# Patient Record
Sex: FEMALE | Race: WHITE | Employment: UNEMPLOYED | URBAN - METROPOLITAN AREA
[De-identification: names, ages, dates, MRNs, and addresses within clinical notes are randomized per-mention and may not be internally consistent; named-entity substitution may affect disease eponyms.]

---

## 2019-06-18 ENCOUNTER — APPOINTMENT (OUTPATIENT)
Dept: CT IMAGING | Age: 39
End: 2019-06-18
Attending: PHYSICIAN ASSISTANT
Payer: MEDICAID

## 2019-06-18 ENCOUNTER — HOSPITAL ENCOUNTER (EMERGENCY)
Age: 39
Discharge: HOME | End: 2019-06-18
Attending: EMERGENCY MEDICINE
Payer: MEDICAID

## 2019-06-18 VITALS
TEMPERATURE: 98.1 F | SYSTOLIC BLOOD PRESSURE: 130 MMHG | RESPIRATION RATE: 18 BRPM | HEART RATE: 98 BPM | OXYGEN SATURATION: 100 % | DIASTOLIC BLOOD PRESSURE: 73 MMHG

## 2019-06-18 DIAGNOSIS — S09.90XA CLOSED HEAD INJURY, INITIAL ENCOUNTER: Primary | ICD-10-CM

## 2019-06-18 PROCEDURE — 74011250636 HC RX REV CODE- 250/636: Performed by: EMERGENCY MEDICINE

## 2019-06-18 PROCEDURE — 99283 EMERGENCY DEPT VISIT LOW MDM: CPT

## 2019-06-18 PROCEDURE — 90471 IMMUNIZATION ADMIN: CPT

## 2019-06-18 PROCEDURE — 90715 TDAP VACCINE 7 YRS/> IM: CPT | Performed by: EMERGENCY MEDICINE

## 2019-06-18 RX ORDER — ONDANSETRON 8 MG/1
8 TABLET, ORALLY DISINTEGRATING ORAL
Qty: 15 TAB | Refills: 0 | Status: SHIPPED | OUTPATIENT
Start: 2019-06-18 | End: 2019-06-18

## 2019-06-18 RX ORDER — ONDANSETRON 8 MG/1
8 TABLET, ORALLY DISINTEGRATING ORAL
Qty: 15 TAB | Refills: 0 | Status: SHIPPED | OUTPATIENT
Start: 2019-06-18 | End: 2022-09-01

## 2019-06-18 RX ADMIN — TETANUS TOXOID, REDUCED DIPHTHERIA TOXOID AND ACELLULAR PERTUSSIS VACCINE, ADSORBED 0.5 ML: 5; 2.5; 8; 8; 2.5 SUSPENSION INTRAMUSCULAR at 19:30

## 2019-06-18 NOTE — ED TRIAGE NOTES
Patient comes to the ER via EMS from [facility] c/o 1.5in laceration after hitting head on metal bar on playground.  6 staples [facility] head per [facility]. Bleeding controlled upon arrival.

## 2019-06-18 NOTE — ED PROVIDER NOTES
42 y.o. female with no significant past medical history who presents from [facility] via EMS with chief complaint of head injury. Patient was playing with her son earlier today at a play ground, when she began chasing him, running under a ramp, and then hitting the top of her head with no LOC. Patient was seen at [facility] where she received 6 staples, and then was referred to the ED for CT scan of her head. Patient presents to [facility] ED with persisting head injury and accompanying nausea and pain at wound on head. Patient states she does not want a CT if it is not necessary. Patient endorses use of  Bupropion for anxiety and depression. Of note, patient does not know when her last Tetanus shot was. Patient denies vomiting and neck pain. There are no other acute medical concerns at this time. Note written by Kamila Somers, as dictated by No att. providers found 6:50 PM       The history is provided by the patient. No  was used. Past Medical History:   Diagnosis Date    Anxiety     Depression        No past surgical history on file. No family history on file.     Social History     Socioeconomic History    Marital status:      Spouse name: Not on file    Number of children: Not on file    Years of education: Not on file    Highest education level: Not on file   Occupational History    Not on file   Social Needs    Financial resource strain: Not on file    Food insecurity:     Worry: Not on file     Inability: Not on file    Transportation needs:     Medical: Not on file     Non-medical: Not on file   Tobacco Use    Smoking status: Not on file   Substance and Sexual Activity    Alcohol use: Not on file    Drug use: Not on file    Sexual activity: Not on file   Lifestyle    Physical activity:     Days per week: Not on file     Minutes per session: Not on file    Stress: Not on file   Relationships    Social connections:     Talks on phone: Not on file     Gets together: Not on file     Attends Shinto service: Not on file     Active member of club or organization: Not on file     Attends meetings of clubs or organizations: Not on file     Relationship status: Not on file    Intimate partner violence:     Fear of current or ex partner: Not on file     Emotionally abused: Not on file     Physically abused: Not on file     Forced sexual activity: Not on file   Other Topics Concern    Not on file   Social History Narrative    Not on file         ALLERGIES: Patient has no known allergies. Review of Systems   Constitutional: Negative for chills, diaphoresis, fatigue and fever. HENT: Negative for congestion, ear pain, rhinorrhea, sore throat and voice change. Respiratory: Negative for shortness of breath. Cardiovascular: Negative for chest pain. Gastrointestinal: Positive for nausea. Negative for abdominal pain, constipation, diarrhea and vomiting. Genitourinary: Negative for difficulty urinating, dyspareunia, dysuria, flank pain and pelvic pain. Musculoskeletal: Negative for back pain, gait problem, joint swelling, myalgias and neck pain. Skin: Positive for wound. Neurological: Negative for dizziness and light-headedness. All other systems reviewed and are negative. Vitals:    06/18/19 1755 06/18/19 1854   BP:  130/73   Pulse: 89 98   Resp:  18   Temp:  98.1 °F (36.7 °C)   SpO2: 99% 100%            Physical Exam   Constitutional: She is oriented to person, place, and time. She appears well-developed and well-nourished. HENT:   Head: Normocephalic. 3-4 cm laceration on top of head, closed with 6 staples. Eyes: Pupils are equal, round, and reactive to light. Neck: Normal range of motion. Neck supple. Cardiovascular: Normal rate and regular rhythm. Pulmonary/Chest: Effort normal and breath sounds normal.   Abdominal: Soft. She exhibits no distension. There is no tenderness. Musculoskeletal: Normal range of motion.  She exhibits no tenderness. No Cervical spine tenderness. Neurological: She is alert and oriented to person, place, and time. Skin: Skin is warm and dry. Capillary refill takes less than 2 seconds. Psychiatric: She has a normal mood and affect. Her behavior is normal.   Nursing note and vitals reviewed. Note written by Kamila Mccullough, as dictated by Paulo Jones MD 6:50 PM       MDM  Number of Diagnoses or Management Options  Closed head injury, initial encounter:   Diagnosis management comments: The patient is now resting comfortably and feels better, is alert and in no distress. The patient has a normal mental status and is neurologically intact. The history, exam, diagnostic testing (if any), and current condition do not demonstrate signs of clinically significant intra-cranial, intra-thoracic, intra-abdominal, or musculoskeletal trauma. Patient satisfies [place] CT and NEXUS criteria. Discussed CT imaging and the patient would like to forgo imaging currently, returning to the ED with worsening or new symptoms. Given return and concussion precautions. The vital signs have been stable. The patient's condition is stable and appropriate for discharge. The patient will pursue further outpatient evaluation with the primary care physician or other designated or consulting physician as indicated in the discharge instructions.            Procedures

## 2020-12-13 ENCOUNTER — APPOINTMENT (OUTPATIENT)
Dept: GENERAL RADIOLOGY | Age: 40
End: 2020-12-13
Attending: EMERGENCY MEDICINE
Payer: MEDICAID

## 2020-12-13 ENCOUNTER — HOSPITAL ENCOUNTER (EMERGENCY)
Age: 40
Discharge: HOME | End: 2020-12-14
Attending: STUDENT IN AN ORGANIZED HEALTH CARE EDUCATION/TRAINING PROGRAM
Payer: MEDICAID

## 2020-12-13 ENCOUNTER — APPOINTMENT (OUTPATIENT)
Dept: ULTRASOUND IMAGING | Age: 40
End: 2020-12-13
Attending: STUDENT IN AN ORGANIZED HEALTH CARE EDUCATION/TRAINING PROGRAM
Payer: MEDICAID

## 2020-12-13 DIAGNOSIS — R74.01 ELEVATED TRANSAMINASE LEVEL: ICD-10-CM

## 2020-12-13 DIAGNOSIS — K76.0 HEPATIC STEATOSIS: Primary | ICD-10-CM

## 2020-12-13 DIAGNOSIS — R63.0 DECREASED APPETITE: ICD-10-CM

## 2020-12-13 DIAGNOSIS — R07.89 CHEST PRESSURE: ICD-10-CM

## 2020-12-13 DIAGNOSIS — R10.12 BILATERAL UPPER ABDOMINAL DISCOMFORT: ICD-10-CM

## 2020-12-13 DIAGNOSIS — R10.11 BILATERAL UPPER ABDOMINAL DISCOMFORT: ICD-10-CM

## 2020-12-13 LAB
ALBUMIN SERPL-MCNC: 4.5 G/DL (ref 3.5–5)
ALBUMIN/GLOB SERPL: 1.3 {RATIO} (ref 1.1–2.2)
ALP SERPL-CCNC: 42 U/L (ref 45–117)
ALT SERPL-CCNC: 111 U/L (ref 12–78)
ANION GAP SERPL CALC-SCNC: 8 MMOL/L (ref 5–15)
APPEARANCE UR: CLEAR
AST SERPL-CCNC: 95 U/L (ref 15–37)
BACTERIA URNS QL MICRO: ABNORMAL /HPF
BASOPHILS # BLD: 0.1 K/UL (ref 0–0.1)
BASOPHILS NFR BLD: 1 % (ref 0–1)
BILIRUB SERPL-MCNC: 0.6 MG/DL (ref 0.2–1)
BILIRUB UR QL: NEGATIVE
BUN SERPL-MCNC: 8 MG/DL (ref 6–20)
BUN/CREAT SERPL: 13 (ref 12–20)
CALCIUM SERPL-MCNC: 9.9 MG/DL (ref 8.5–10.1)
CHLORIDE SERPL-SCNC: 100 MMOL/L (ref 97–108)
CO2 SERPL-SCNC: 25 MMOL/L (ref 21–32)
COLOR UR: ABNORMAL
COMMENT, HOLDF: NORMAL
CREAT SERPL-MCNC: 0.64 MG/DL (ref 0.55–1.02)
DIFFERENTIAL METHOD BLD: ABNORMAL
EOSINOPHIL # BLD: 0 K/UL (ref 0–0.4)
EOSINOPHIL NFR BLD: 0 % (ref 0–7)
EPITH CASTS URNS QL MICRO: ABNORMAL /LPF
ERYTHROCYTE [DISTWIDTH] IN BLOOD BY AUTOMATED COUNT: 14.6 % (ref 11.5–14.5)
GLOBULIN SER CALC-MCNC: 3.5 G/DL (ref 2–4)
GLUCOSE SERPL-MCNC: 96 MG/DL (ref 65–100)
GLUCOSE UR STRIP.AUTO-MCNC: NEGATIVE MG/DL
HCG SERPL QL: NEGATIVE
HCT VFR BLD AUTO: 37.8 % (ref 35–47)
HGB BLD-MCNC: 12.4 G/DL (ref 11.5–16)
HGB UR QL STRIP: NEGATIVE
HYALINE CASTS URNS QL MICRO: ABNORMAL /LPF (ref 0–5)
IMM GRANULOCYTES # BLD AUTO: 0 K/UL (ref 0–0.04)
IMM GRANULOCYTES NFR BLD AUTO: 0 % (ref 0–0.5)
KETONES UR QL STRIP.AUTO: 80 MG/DL
LEUKOCYTE ESTERASE UR QL STRIP.AUTO: ABNORMAL
LIPASE SERPL-CCNC: 164 U/L (ref 73–393)
LYMPHOCYTES # BLD: 1.2 K/UL (ref 0.8–3.5)
LYMPHOCYTES NFR BLD: 27 % (ref 12–49)
MCH RBC QN AUTO: 28.3 PG (ref 26–34)
MCHC RBC AUTO-ENTMCNC: 32.8 G/DL (ref 30–36.5)
MCV RBC AUTO: 86.3 FL (ref 80–99)
MONOCYTES # BLD: 0.5 K/UL (ref 0–1)
MONOCYTES NFR BLD: 12 % (ref 5–13)
NEUTS SEG # BLD: 2.7 K/UL (ref 1.8–8)
NEUTS SEG NFR BLD: 60 % (ref 32–75)
NITRITE UR QL STRIP.AUTO: NEGATIVE
NRBC # BLD: 0 K/UL (ref 0–0.01)
NRBC BLD-RTO: 0 PER 100 WBC
PH UR STRIP: 5 [PH] (ref 5–8)
PLATELET # BLD AUTO: 262 K/UL (ref 150–400)
PMV BLD AUTO: 9.6 FL (ref 8.9–12.9)
POTASSIUM SERPL-SCNC: 3.7 MMOL/L (ref 3.5–5.1)
PROT SERPL-MCNC: 8 G/DL (ref 6.4–8.2)
PROT UR STRIP-MCNC: NEGATIVE MG/DL
RBC # BLD AUTO: 4.38 M/UL (ref 3.8–5.2)
RBC #/AREA URNS HPF: ABNORMAL /HPF (ref 0–5)
SAMPLES BEING HELD,HOLD: NORMAL
SODIUM SERPL-SCNC: 133 MMOL/L (ref 136–145)
SP GR UR REFRACTOMETRY: 1.02 (ref 1–1.03)
TROPONIN I SERPL-MCNC: <0.05 NG/ML
UR CULT HOLD, URHOLD: NORMAL
UROBILINOGEN UR QL STRIP.AUTO: 1 EU/DL (ref 0.2–1)
WBC # BLD AUTO: 4.5 K/UL (ref 3.6–11)
WBC URNS QL MICRO: ABNORMAL /HPF (ref 0–4)

## 2020-12-13 PROCEDURE — 99284 EMERGENCY DEPT VISIT MOD MDM: CPT

## 2020-12-13 PROCEDURE — 93005 ELECTROCARDIOGRAM TRACING: CPT

## 2020-12-13 PROCEDURE — 71046 X-RAY EXAM CHEST 2 VIEWS: CPT

## 2020-12-13 PROCEDURE — 74011250636 HC RX REV CODE- 250/636: Performed by: STUDENT IN AN ORGANIZED HEALTH CARE EDUCATION/TRAINING PROGRAM

## 2020-12-13 PROCEDURE — 85025 COMPLETE CBC W/AUTO DIFF WBC: CPT

## 2020-12-13 PROCEDURE — 80053 COMPREHEN METABOLIC PANEL: CPT

## 2020-12-13 PROCEDURE — 84484 ASSAY OF TROPONIN QUANT: CPT

## 2020-12-13 PROCEDURE — 81001 URINALYSIS AUTO W/SCOPE: CPT

## 2020-12-13 PROCEDURE — 84703 CHORIONIC GONADOTROPIN ASSAY: CPT

## 2020-12-13 PROCEDURE — 76705 ECHO EXAM OF ABDOMEN: CPT

## 2020-12-13 PROCEDURE — 83690 ASSAY OF LIPASE: CPT

## 2020-12-13 PROCEDURE — 36415 COLL VENOUS BLD VENIPUNCTURE: CPT

## 2020-12-13 RX ADMIN — SODIUM CHLORIDE 1000 ML: 900 INJECTION, SOLUTION INTRAVENOUS at 23:50

## 2020-12-14 VITALS
WEIGHT: 138 LBS | OXYGEN SATURATION: 100 % | DIASTOLIC BLOOD PRESSURE: 94 MMHG | BODY MASS INDEX: 21.66 KG/M2 | SYSTOLIC BLOOD PRESSURE: 136 MMHG | TEMPERATURE: 97.9 F | HEIGHT: 67 IN | HEART RATE: 87 BPM | RESPIRATION RATE: 17 BRPM

## 2020-12-14 LAB
ATRIAL RATE: 96 BPM
CALCULATED P AXIS, ECG09: -12 DEGREES
CALCULATED R AXIS, ECG10: 43 DEGREES
CALCULATED T AXIS, ECG11: 29 DEGREES
DIAGNOSIS, 93000: NORMAL
P-R INTERVAL, ECG05: 124 MS
Q-T INTERVAL, ECG07: 384 MS
QRS DURATION, ECG06: 82 MS
QTC CALCULATION (BEZET), ECG08: 485 MS
VENTRICULAR RATE, ECG03: 96 BPM

## 2020-12-14 NOTE — DISCHARGE INSTRUCTIONS
It appears that you have fatty liver on ultrasound. There are no other abnormalities. A copy of your ultrasound report is below. Maintain a healthy diet, avoid or cease drinking alcohol. Continue your antacid. Follow-up with primary care. We have included a list of associated primary care physicians in the area. Following up with a gastroenterologist will be helpful to further evaluate your discomfort and to obtain an endoscopy which can be diagnostic for gastritis or peptic ulcer.

## 2020-12-14 NOTE — ED TRIAGE NOTES
Pt arrives ambulatory c/o chest pressure for 2 weeks. Pt also c/ o alternating Left and right under rib cage pain. 6: 10 pain scale. Pt was seen at [facility] and had normal EKG and lab work. Pt denies vomiting but has had nausea. Pt denies other issues, is AOx4 in no apparent distress.

## 2020-12-14 NOTE — ED NOTES
Provider to bedside to explain results and given list of local PCP's. Client verbalized understanding of plan of care.

## 2020-12-14 NOTE — ED PROVIDER NOTES
21-year-old woman with history of anxiety and depression, has a history of recent alcohol abuse but has been drinking infrequently for the past month or 2. She presents today with epigastric discomfort, poor appetite, a pressure-like sensation in the mid sternum and intermittent sharp pain in the bilateral subcostal regions. The pain in the subcostal region as fleeting and not associated with eating, drinking or any movements. She is not clear on exacerbating factors. She has not seen her primary care physician in some time. She has not had cough or shortness of breath. No leg swelling. No rash. Denies fevers or chills. No headache or syncope. She was concerned that it may be an abnormality with her spleen or liver. She has not been taking excessive amounts of acetaminophen, is not on any other chronic medications. She has been eating a fairly balanced diet. Past Medical History:   Diagnosis Date    Anxiety     Depression        No past surgical history on file. No family history on file.     Social History     Socioeconomic History    Marital status:      Spouse name: Not on file    Number of children: Not on file    Years of education: Not on file    Highest education level: Not on file   Occupational History    Not on file   Social Needs    Financial resource strain: Not on file    Food insecurity     Worry: Not on file     Inability: Not on file    Transportation needs     Medical: Not on file     Non-medical: Not on file   Tobacco Use    Smoking status: Not on file   Substance and Sexual Activity    Alcohol use: Not on file    Drug use: Not on file    Sexual activity: Not on file   Lifestyle    Physical activity     Days per week: Not on file     Minutes per session: Not on file    Stress: Not on file   Relationships    Social connections     Talks on phone: Not on file     Gets together: Not on file     Attends Rastafarian service: Not on file     Active member of club or organization: Not on file     Attends meetings of clubs or organizations: Not on file     Relationship status: Not on file    Intimate partner violence     Fear of current or ex partner: Not on file     Emotionally abused: Not on file     Physically abused: Not on file     Forced sexual activity: Not on file   Other Topics Concern    Not on file   Social History Narrative    Not on file         ALLERGIES: Compazine [prochlorperazine]    Review of Systems   Constitutional: Negative for chills and fever. Eyes: Negative for photophobia. Respiratory: Negative for cough and shortness of breath. Cardiovascular: Positive for chest pain. Negative for palpitations and leg swelling. Gastrointestinal: Negative for abdominal pain. Genitourinary: Negative for dysuria. Musculoskeletal: Negative for back pain. Neurological: Negative for light-headedness and headaches. Psychiatric/Behavioral: Negative for confusion. The patient is nervous/anxious. All other systems reviewed and are negative. Vitals:    12/13/20 1945 12/13/20 2350   BP: (!) 152/95 (!) 136/94   Pulse: 87    Resp: 17    Temp: 97.9 °F (36.6 °C)    SpO2: 100% 100%   Weight: 62.6 kg (138 lb)    Height: 5' 7\" (1.702 m)             Physical Exam  Vitals signs reviewed. Constitutional:       General: She is not in acute distress. HENT:      Head: Normocephalic and atraumatic. Mouth/Throat:      Mouth: Mucous membranes are moist.      Pharynx: Oropharynx is clear. Cardiovascular:      Rate and Rhythm: Normal rate and regular rhythm. Heart sounds: Normal heart sounds. Pulmonary:      Effort: Pulmonary effort is normal. No tachypnea or respiratory distress. Breath sounds: Normal breath sounds. Abdominal:      Tenderness: There is abdominal tenderness. There is no guarding or rebound. Comments: Minimal tenderness in the bilateral upper quadrants. Musculoskeletal: Normal range of motion.    Skin:     General: Skin is warm and dry. Capillary Refill: Capillary refill takes less than 2 seconds. Neurological:      General: No focal deficit present. Mental Status: She is alert and oriented to person, place, and time. Psychiatric:         Mood and Affect: Mood normal.          MDM  Number of Diagnoses or Management Options  Bilateral upper abdominal discomfort:   Chest pressure:   Decreased appetite:   Elevated transaminase level:   Hepatic steatosis:     ED Course as of Dec 14 0003   Sun Dec 13, 2020   2134 Bilirubin, total: 0.6 [NS]      ED Course User Index  [NS] Kalyani Toledo MD       Procedures          EK  Normal sinus rhythm, tracheal rate 96, normal axis, no ST elevation or depression. Normal intervals. Luis Miguel Iverson is a 47 y.o. female presenting with chest comfort and bilateral upper quadrant discomfort. .    Differential includes gastritis, GERD, gastroparesis, esophagitis, less likely ACS, PE, dissection. She is PERC negative. Her troponin is negative. Her EKG is unremarkable. Chest x-ray is clear, there is no cardiomegaly or airspace disease. She is on any COVID-19 contacts. Overall she is appearing well. Vital signs are normal.  Follow-up with outpatient providers including GI and primary care. Return if worse.       Dispo: DC

## 2021-08-09 ENCOUNTER — HOSPITAL ENCOUNTER (EMERGENCY)
Age: 41
Discharge: HOME | End: 2021-08-09
Attending: EMERGENCY MEDICINE
Payer: MEDICAID

## 2021-08-09 ENCOUNTER — APPOINTMENT (OUTPATIENT)
Dept: CT IMAGING | Age: 41
End: 2021-08-09
Attending: EMERGENCY MEDICINE
Payer: MEDICAID

## 2021-08-09 VITALS
OXYGEN SATURATION: 99 % | TEMPERATURE: 96.8 F | HEART RATE: 82 BPM | HEIGHT: 67 IN | BODY MASS INDEX: 21.19 KG/M2 | SYSTOLIC BLOOD PRESSURE: 146 MMHG | WEIGHT: 135 LBS | DIASTOLIC BLOOD PRESSURE: 100 MMHG | RESPIRATION RATE: 24 BRPM

## 2021-08-09 DIAGNOSIS — K52.9 ILEITIS: Primary | ICD-10-CM

## 2021-08-09 DIAGNOSIS — R19.7 DIARRHEA, UNSPECIFIED TYPE: ICD-10-CM

## 2021-08-09 PROBLEM — K56.1 INTUSSUSCEPTION INTESTINE (HCC): Status: ACTIVE | Noted: 2021-08-09

## 2021-08-09 PROBLEM — K50.019 CROHN'S DISEASE OF SMALL INTESTINE WITH COMPLICATION (HCC): Status: ACTIVE | Noted: 2021-08-09

## 2021-08-09 LAB
ALBUMIN SERPL-MCNC: 4 G/DL (ref 3.5–5)
ALBUMIN/GLOB SERPL: 1.3 {RATIO} (ref 1.1–2.2)
ALP SERPL-CCNC: 39 U/L (ref 45–117)
ALT SERPL-CCNC: 47 U/L (ref 12–78)
ANION GAP SERPL CALC-SCNC: 11 MMOL/L (ref 5–15)
APPEARANCE UR: ABNORMAL
AST SERPL-CCNC: 54 U/L (ref 15–37)
BACTERIA URNS QL MICRO: ABNORMAL /HPF
BASOPHILS # BLD: 0.1 K/UL (ref 0–0.1)
BASOPHILS NFR BLD: 1 % (ref 0–1)
BILIRUB SERPL-MCNC: 0.7 MG/DL (ref 0.2–1)
BILIRUB UR QL: NEGATIVE
BUN SERPL-MCNC: 10 MG/DL (ref 6–20)
BUN/CREAT SERPL: 18 (ref 12–20)
CALCIUM SERPL-MCNC: 9.1 MG/DL (ref 8.5–10.1)
CHLORIDE SERPL-SCNC: 104 MMOL/L (ref 97–108)
CO2 SERPL-SCNC: 21 MMOL/L (ref 21–32)
COLOR UR: ABNORMAL
COMMENT, HOLDF: NORMAL
CREAT SERPL-MCNC: 0.55 MG/DL (ref 0.55–1.02)
DIFFERENTIAL METHOD BLD: ABNORMAL
EOSINOPHIL # BLD: 0.1 K/UL (ref 0–0.4)
EOSINOPHIL NFR BLD: 1 % (ref 0–7)
EPITH CASTS URNS QL MICRO: ABNORMAL /LPF
ERYTHROCYTE [DISTWIDTH] IN BLOOD BY AUTOMATED COUNT: 16.9 % (ref 11.5–14.5)
GLOBULIN SER CALC-MCNC: 3.2 G/DL (ref 2–4)
GLUCOSE SERPL-MCNC: 84 MG/DL (ref 65–100)
GLUCOSE UR STRIP.AUTO-MCNC: NEGATIVE MG/DL
HCT VFR BLD AUTO: 39.2 % (ref 35–47)
HGB BLD-MCNC: 13 G/DL (ref 11.5–16)
HGB UR QL STRIP: ABNORMAL
IMM GRANULOCYTES # BLD AUTO: 0 K/UL (ref 0–0.04)
IMM GRANULOCYTES NFR BLD AUTO: 0 % (ref 0–0.5)
KETONES UR QL STRIP.AUTO: 80 MG/DL
LACTATE SERPL-SCNC: 0.7 MMOL/L (ref 0.4–2)
LEUKOCYTE ESTERASE UR QL STRIP.AUTO: ABNORMAL
LIPASE SERPL-CCNC: 113 U/L (ref 73–393)
LYMPHOCYTES # BLD: 0.9 K/UL (ref 0.8–3.5)
LYMPHOCYTES NFR BLD: 9 % (ref 12–49)
MCH RBC QN AUTO: 27.7 PG (ref 26–34)
MCHC RBC AUTO-ENTMCNC: 33.2 G/DL (ref 30–36.5)
MCV RBC AUTO: 83.6 FL (ref 80–99)
MONOCYTES # BLD: 0.7 K/UL (ref 0–1)
MONOCYTES NFR BLD: 6 % (ref 5–13)
NEUTS SEG # BLD: 9.2 K/UL (ref 1.8–8)
NEUTS SEG NFR BLD: 83 % (ref 32–75)
NITRITE UR QL STRIP.AUTO: NEGATIVE
NRBC # BLD: 0 K/UL (ref 0–0.01)
NRBC BLD-RTO: 0 PER 100 WBC
PH UR STRIP: 5 [PH] (ref 5–8)
PLATELET # BLD AUTO: 306 K/UL (ref 150–400)
PMV BLD AUTO: 9.6 FL (ref 8.9–12.9)
POTASSIUM SERPL-SCNC: 3.8 MMOL/L (ref 3.5–5.1)
PROT SERPL-MCNC: 7.2 G/DL (ref 6.4–8.2)
PROT UR STRIP-MCNC: NEGATIVE MG/DL
RBC # BLD AUTO: 4.69 M/UL (ref 3.8–5.2)
RBC #/AREA URNS HPF: ABNORMAL /HPF (ref 0–5)
SAMPLES BEING HELD,HOLD: NORMAL
SODIUM SERPL-SCNC: 136 MMOL/L (ref 136–145)
SP GR UR REFRACTOMETRY: 1.03 (ref 1–1.03)
UR CULT HOLD, URHOLD: NORMAL
UROBILINOGEN UR QL STRIP.AUTO: 0.2 EU/DL (ref 0.2–1)
WBC # BLD AUTO: 10.9 K/UL (ref 3.6–11)
WBC URNS QL MICRO: ABNORMAL /HPF (ref 0–4)

## 2021-08-09 PROCEDURE — 74011250637 HC RX REV CODE- 250/637: Performed by: EMERGENCY MEDICINE

## 2021-08-09 PROCEDURE — 81001 URINALYSIS AUTO W/SCOPE: CPT

## 2021-08-09 PROCEDURE — 83605 ASSAY OF LACTIC ACID: CPT

## 2021-08-09 PROCEDURE — 74177 CT ABD & PELVIS W/CONTRAST: CPT

## 2021-08-09 PROCEDURE — 83690 ASSAY OF LIPASE: CPT

## 2021-08-09 PROCEDURE — 74011000636 HC RX REV CODE- 636: Performed by: EMERGENCY MEDICINE

## 2021-08-09 PROCEDURE — 74011250636 HC RX REV CODE- 250/636: Performed by: EMERGENCY MEDICINE

## 2021-08-09 PROCEDURE — 96361 HYDRATE IV INFUSION ADD-ON: CPT

## 2021-08-09 PROCEDURE — 99204 OFFICE O/P NEW MOD 45 MIN: CPT | Performed by: SURGERY

## 2021-08-09 PROCEDURE — 96374 THER/PROPH/DIAG INJ IV PUSH: CPT

## 2021-08-09 PROCEDURE — 80053 COMPREHEN METABOLIC PANEL: CPT

## 2021-08-09 PROCEDURE — 85025 COMPLETE CBC W/AUTO DIFF WBC: CPT

## 2021-08-09 PROCEDURE — 99283 EMERGENCY DEPT VISIT LOW MDM: CPT

## 2021-08-09 PROCEDURE — 96375 TX/PRO/DX INJ NEW DRUG ADDON: CPT

## 2021-08-09 PROCEDURE — 36415 COLL VENOUS BLD VENIPUNCTURE: CPT

## 2021-08-09 RX ORDER — ONDANSETRON 2 MG/ML
4 INJECTION INTRAMUSCULAR; INTRAVENOUS
Status: COMPLETED | OUTPATIENT
Start: 2021-08-09 | End: 2021-08-09

## 2021-08-09 RX ORDER — KETOROLAC TROMETHAMINE 30 MG/ML
15 INJECTION, SOLUTION INTRAMUSCULAR; INTRAVENOUS
Status: COMPLETED | OUTPATIENT
Start: 2021-08-09 | End: 2021-08-09

## 2021-08-09 RX ORDER — DICYCLOMINE HYDROCHLORIDE 10 MG/1
20 CAPSULE ORAL
Status: COMPLETED | OUTPATIENT
Start: 2021-08-09 | End: 2021-08-09

## 2021-08-09 RX ORDER — DICYCLOMINE HYDROCHLORIDE 20 MG/1
20 TABLET ORAL EVERY 6 HOURS
Qty: 20 TABLET | Refills: 0 | Status: SHIPPED | OUTPATIENT
Start: 2021-08-09 | End: 2022-09-01

## 2021-08-09 RX ADMIN — ONDANSETRON 4 MG: 2 INJECTION INTRAMUSCULAR; INTRAVENOUS at 08:21

## 2021-08-09 RX ADMIN — SODIUM CHLORIDE 1000 ML: 9 INJECTION, SOLUTION INTRAVENOUS at 11:18

## 2021-08-09 RX ADMIN — SODIUM CHLORIDE 1000 ML: 9 INJECTION, SOLUTION INTRAVENOUS at 08:21

## 2021-08-09 RX ADMIN — IOPAMIDOL 100 ML: 755 INJECTION, SOLUTION INTRAVENOUS at 10:11

## 2021-08-09 RX ADMIN — DICYCLOMINE HYDROCHLORIDE 20 MG: 10 CAPSULE ORAL at 12:58

## 2021-08-09 RX ADMIN — KETOROLAC TROMETHAMINE 15 MG: 30 INJECTION, SOLUTION INTRAMUSCULAR; INTRAVENOUS at 08:21

## 2021-08-09 NOTE — CONSULTS
[address]     Subjective:      HPI patient is a 79-year-old otherwise previously healthy female by her report with no history of inflammatory bowel disease or GI issues or colonoscopy who was presenting to the emergency room with 12-hour history of abdominal pain and somewhat mucousy diarrhea but not bloody and some nausea but no emesis. Abdominal pain is in the right side more lower than upper. She has not had similar symptoms has not eaten any unusual or raw foods by her report. White blood cell count was normal electrolytes normal.  CT abdomen below  Narrative & Impression   CT ABDOMEN AND PELVIS WITH CONTRAST. 8/9/2021 10:11 AM      INDICATION: Right upper quadrant abdominal pain.     COMPARISON: None.     TECHNIQUE: CT of the abdomen and pelvis was performed after the administration  100 cc IV Isovue-370. CT dose reduction was achieved through use of a  standardized protocol tailored for this examination and automatic exposure  control for dose modulation.      FINDINGS:  Abdomen: The lung bases are clear. The heart size is normal. The distal  esophagus, stomach, duodenum, liver, gallbladder, pancreas, spleen, adrenals,  and kidneys are normal.     Pelvis: Extensive inflammation involves the entire ileum, with wall thickening,  mucosal hyperemia, mesenteric hyperemia, perienteric fat stranding, and a small  volume of ascites. A jejunal intussusception (601-30) is likely an incidental,  transient finding. The jejunum is otherwise normal. The appendix is normal.  There is probably secondary inflammation of the cecum and ascending colon. The  colon is otherwise normal. No free air and no abdominopelvic lymphadenopathy. An  IUD is in place. The bladder is normal.     IMPRESSION  Severe ileitis involving the entire ileum. We were asked to see patient regarding incidental jejunal intussusception which is likely an incidental finding and not obstructed on CT scan.       Past Medical History: Diagnosis Date    Anxiety     Depression      No past surgical history on file. No family history on file. Social History     Tobacco Use    Smoking status: Not on file   Substance Use Topics    Alcohol use: Not on file      Prior to Admission medications    Medication Sig Start Date End Date Taking? Authorizing Provider   dicyclomine (BENTYL) 20 mg tablet Take 1 Tablet by mouth every six (6) hours. 21  Yes Bonnie Bryant NP   ondansetron (ZOFRAN ODT) 8 mg disintegrating tablet Take 1 Tab by mouth every eight (8) hours as needed for Nausea. 19   Tiffanie Mullins MD      Allergies   Allergen Reactions    Compazine [Prochlorperazine] Nausea Only       Review of Systems   Respiratory: Negative. Cardiovascular: Negative. Gastrointestinal: Positive for abdominal pain, diarrhea and nausea. Negative for blood in stool and vomiting. Skin: Negative. Neurological: Negative. Psychiatric/Behavioral: Negative. All other systems reviewed and are negative. Objective:     Patient Vitals for the past 8 hrs:   BP Temp Pulse Resp SpO2 Height Weight   21 0705 (!) 146/100 96.8 °F (36 °C) 82 24 99 % 5' 7\" (1.702 m) 61.2 kg (135 lb)       Temp (24hrs), Av.8 °F (36 °C), Min:96.8 °F (36 °C), Max:96.8 °F (36 °C)      Physical Exam  Vitals and nursing note reviewed. Exam conducted with a chaperone present (JEFFREY Chau). Constitutional:       Appearance: Normal appearance. Comments: Patient somewhat anxious appearing but no acute distress   Cardiovascular:      Rate and Rhythm: Normal rate. Pulmonary:      Effort: Pulmonary effort is normal.   Abdominal:      Comments: Soft, nondistended, no obvious hernias mild tenderness right lower to mid abdomen but no peritoneal signs or guarding no obvious hernias   Musculoskeletal:         General: Normal range of motion. Cervical back: Normal range of motion. Neurological:      General: No focal deficit present.       Mental Status: She is alert and oriented to person, place, and time. Psychiatric:         Mood and Affect: Mood normal.         Behavior: Behavior normal.         Thought Content: Thought content normal.         Judgment: Judgment normal.         Assessment:     Active Problems:    Crohn's disease of small intestine with complication ([address] (9/0/3101)      Intussusception intestine ([facility]) (8/9/2021)        Plan:     Intussusception likely incidental and I do not think this is a source of her symptoms, is not obstructed. I think her main source of symptoms are the ileitis possible inflammatory bowel disease versus viral other bacterial source. Advised patient from a surgical standpoint there really was not much we should do for the intussusception if she was more concerned we could follow-up a CT scan with oral contrast but I advised her that the emergency room sta/hospitalist team needed to discuss whether patient should be admitted for GI work-up and IV fluids and monitoring but nothing acutely surgical about this. The patient was fairly insistent that she wanted to be treated at home I advised her that if she was discharged home without tolerating liquids that she may get worse and return and I see by the time I was dictating this showed that she was discharged home. There is nothing surgical follow-up that needs to be done but she likely will need primary care or GI follow-up.   This was reviewed with ER nurse practitioner Maris Razo time including review of any indicated imaging, discussion with patient, and other providers, exam and discussion with patient:  39      Minutes    Signed By: Rubi Sanders MD   [facility]    August 9, 2021

## 2021-08-09 NOTE — ED TRIAGE NOTES
Patient arrives ambulatory to ED with CC of abdominal pain that started around 0200 accompanied by nausea and diarrhea. She stated that the pain comes and goes about every 2 minutes and is sharp in nature. Patient reports no medical hx.

## 2021-08-09 NOTE — ED PROVIDER NOTES
HPI patient is a 45-year-old female with no significant past medical history who presents to the ED reporting abrupt onset of epigastric/right upper quadrant abdominal pain around 2 AM this morning accompanied by episodic nonbloody diarrhea and nausea. Denies fever, cold symptoms, headache, neck pain, visual changes, focal weakness or rash. Denies any  difficulty breathing, difficulty swallowing, SOB or chest pain. Denies any  vomiting or urinary symptoms. Pt. Reports that she has not any food or medications since yesterday. She drove herself here. Past Medical History:   Diagnosis Date    Anxiety     Depression        No past surgical history on file. No family history on file. Social History     Socioeconomic History    Marital status:      Spouse name: Not on file    Number of children: Not on file    Years of education: Not on file    Highest education level: Not on file   Occupational History    Not on file   Tobacco Use    Smoking status: Not on file   Substance and Sexual Activity    Alcohol use: Not on file    Drug use: Not on file    Sexual activity: Not on file   Other Topics Concern    Not on file   Social History Narrative    Not on file     Social Determinants of Health     Financial Resource Strain:     Difficulty of Paying Living Expenses:    Food Insecurity:     Worried About Running Out of Food in the Last Year:     [address] in the Last Year:    Transportation Needs:     Lack of Transportation (Medical):      Lack of Transportation (Non-Medical):    Physical Activity:     Days of Exercise per Week:     Minutes of Exercise per Session:    Stress:     Feeling of Stress :    Social Connections:     Frequency of Communication with Friends and Family:     Frequency of Social Gatherings with Friends and Family:     Attends Hinduism Services:     Active Member of Clubs or Organizations:     Attends Club or Organization Meetings:     Marital Status: Intimate Partner Violence:     Fear of Current or Ex-Partner:     Emotionally Abused:     Physically Abused:     Sexually Abused: ALLERGIES: Compazine [prochlorperazine]    Review of Systems   Constitutional: Negative for activity change, appetite change, fever and unexpected weight change. HENT: Negative for congestion, rhinorrhea, sore throat and trouble swallowing. Eyes: Negative for visual disturbance. Respiratory: Negative for cough and shortness of breath. Cardiovascular: Negative for chest pain, palpitations and leg swelling. Gastrointestinal: Positive for abdominal pain, diarrhea and nausea. Negative for vomiting. Genitourinary: Negative for dysuria. Musculoskeletal: Negative for arthralgias, back pain and myalgias. Skin: Negative for rash. Neurological: Negative for dizziness, light-headedness and headaches. All other systems reviewed and are negative. Vitals:    08/09/21 0705   BP: (!) 146/100   Pulse: 82   Resp: 24   Temp: 96.8 °F (36 °C)   SpO2: 99%   Weight: 61.2 kg (135 lb)   Height: 5' 7\" (1.702 m)            Physical Exam  Vitals and nursing note reviewed. Constitutional:       General: She is not in acute distress. Appearance: Normal appearance. She is normal weight. She is ill-appearing. She is not toxic-appearing or diaphoretic. Comments: Female; smoker   HENT:      Head: Normocephalic. Right Ear: Tympanic membrane normal.      Left Ear: Tympanic membrane normal.      Nose: Nose normal.      Mouth/Throat:      Mouth: Mucous membranes are moist.      Pharynx: No posterior oropharyngeal erythema. Cardiovascular:      Rate and Rhythm: Normal rate and regular rhythm. Pulmonary:      Effort: Pulmonary effort is normal.      Breath sounds: Normal breath sounds. Abdominal:      General: Bowel sounds are normal.      Palpations: Abdomen is soft. Tenderness: There is abdominal tenderness. There is no guarding or rebound.       Hernia: No hernia is present. Musculoskeletal:         General: Normal range of motion. Cervical back: Normal range of motion and neck supple. No tenderness. Right lower leg: No edema. Left lower leg: No edema. Lymphadenopathy:      Cervical: No cervical adenopathy. Skin:     General: Skin is warm and dry. Findings: No rash. Neurological:      General: No focal deficit present. Mental Status: She is alert and oriented to person, place, and time. Psychiatric:         Mood and Affect: Mood normal.         Behavior: Behavior normal.          MDM       Procedures      Labs Reviewed   CBC WITH AUTOMATED DIFF - Abnormal; Notable for the following components:       Result Value    RDW 16.9 (*)     NEUTROPHILS 83 (*)     LYMPHOCYTES 9 (*)     ABS. NEUTROPHILS 9.2 (*)     All other components within normal limits   METABOLIC PANEL, COMPREHENSIVE - Abnormal; Notable for the following components:    AST (SGOT) 54 (*)     Alk. phosphatase 39 (*)     All other components within normal limits   URINALYSIS W/MICROSCOPIC - Abnormal; Notable for the following components:    Appearance CLOUDY (*)     Ketone 80 (*)     Blood LARGE (*)     Leukocyte Esterase TRACE (*)     Epithelial cells MODERATE (*)     Bacteria 1+ (*)     All other components within normal limits   URINE CULTURE HOLD SAMPLE   LIPASE   SAMPLES BEING HELD   LACTIC ACID     CT ABD PELV W CONT    Result Date: 8/9/2021  Severe ileitis involving the entire ileum. General surgery was consulted; they  do not feel that she has a surgical abdomen. (Dr. Janet Rothman. Daysi Valverde NP)    Patient was offered admission but refused. Reviewed oral rehydration recommendations and close follow-up with gastroenterology for further evaluation. Patient's results and plan of care have been reviewed with her.   Patient has verbally conveyed her understanding and agreement of her signs, symptoms, diagnosis, treatment and prognosis and additionally agrees to follow up as recommended or return to the Emergency Room should her condition change prior to follow-up. Discharge instructions have also been provided to the patient with some educational information regarding her diagnosis as well a list of reasons why she would want to return to the ER prior to her follow-up appointment should her condition change. Roderick Horton NP  Discussed plan of care with Dr. Tanesha Perkins.  Roderick Horton NP

## 2021-08-23 ENCOUNTER — TRANSCRIBE ORDER (OUTPATIENT)
Dept: REGISTRATION | Age: 41
End: 2021-08-23

## 2021-08-23 ENCOUNTER — HOSPITAL ENCOUNTER (OUTPATIENT)
Dept: PREADMISSION TESTING | Age: 41
Discharge: HOME | End: 2021-08-23
Payer: MEDICAID

## 2021-08-23 DIAGNOSIS — Z01.812 PRE-PROCEDURE LAB EXAM: Primary | ICD-10-CM

## 2021-08-23 DIAGNOSIS — Z01.812 PRE-PROCEDURE LAB EXAM: ICD-10-CM

## 2021-08-23 PROCEDURE — U0003 INFECTIOUS AGENT DETECTION BY NUCLEIC ACID (DNA OR RNA); SEVERE ACUTE RESPIRATORY SYNDROME CORONAVIRUS 2 (SARS-COV-2) (CORONAVIRUS DISEASE [COVID-19]), AMPLIFIED PROBE TECHNIQUE, MAKING USE OF HIGH THROUGHPUT TECHNOLOGIES AS DESCRIBED BY CMS-2020-01-R: HCPCS

## 2021-08-24 LAB — SARS-COV-2, COV2NT: NOT DETECTED

## 2021-08-26 ENCOUNTER — ANESTHESIA (OUTPATIENT)
Dept: ENDOSCOPY | Age: 41
End: 2021-08-26
Payer: MEDICAID

## 2021-08-26 ENCOUNTER — HOSPITAL ENCOUNTER (OUTPATIENT)
Age: 41
Setting detail: OUTPATIENT SURGERY
Discharge: HOME | End: 2021-08-26
Attending: INTERNAL MEDICINE | Admitting: INTERNAL MEDICINE
Payer: MEDICAID

## 2021-08-26 ENCOUNTER — ANESTHESIA EVENT (OUTPATIENT)
Dept: ENDOSCOPY | Age: 41
End: 2021-08-26
Payer: MEDICAID

## 2021-08-26 VITALS
SYSTOLIC BLOOD PRESSURE: 118 MMHG | HEART RATE: 76 BPM | DIASTOLIC BLOOD PRESSURE: 82 MMHG | OXYGEN SATURATION: 100 % | WEIGHT: 135 LBS | RESPIRATION RATE: 17 BRPM | TEMPERATURE: 97.8 F | HEIGHT: 67 IN | BODY MASS INDEX: 21.19 KG/M2

## 2021-08-26 LAB — HCG UR QL: NEGATIVE

## 2021-08-26 PROCEDURE — 76040000019: Performed by: INTERNAL MEDICINE

## 2021-08-26 PROCEDURE — 74011250636 HC RX REV CODE- 250/636: Performed by: INTERNAL MEDICINE

## 2021-08-26 PROCEDURE — 74011000250 HC RX REV CODE- 250: Performed by: NURSE ANESTHETIST, CERTIFIED REGISTERED

## 2021-08-26 PROCEDURE — 74011250637 HC RX REV CODE- 250/637: Performed by: INTERNAL MEDICINE

## 2021-08-26 PROCEDURE — 76060000031 HC ANESTHESIA FIRST 0.5 HR: Performed by: INTERNAL MEDICINE

## 2021-08-26 PROCEDURE — 88305 TISSUE EXAM BY PATHOLOGIST: CPT

## 2021-08-26 PROCEDURE — 77030019988 HC FCPS ENDOSC DISP BSC -B: Performed by: INTERNAL MEDICINE

## 2021-08-26 PROCEDURE — 81025 URINE PREGNANCY TEST: CPT

## 2021-08-26 PROCEDURE — 74011250636 HC RX REV CODE- 250/636: Performed by: NURSE ANESTHETIST, CERTIFIED REGISTERED

## 2021-08-26 RX ORDER — ATROPINE SULFATE 0.1 MG/ML
0.5 INJECTION INTRAVENOUS
Status: DISCONTINUED | OUTPATIENT
Start: 2021-08-26 | End: 2021-08-26 | Stop reason: HOSPADM

## 2021-08-26 RX ORDER — NALOXONE HYDROCHLORIDE 0.4 MG/ML
0.4 INJECTION, SOLUTION INTRAMUSCULAR; INTRAVENOUS; SUBCUTANEOUS
Status: DISCONTINUED | OUTPATIENT
Start: 2021-08-26 | End: 2021-08-26 | Stop reason: HOSPADM

## 2021-08-26 RX ORDER — SODIUM CHLORIDE 9 MG/ML
50 INJECTION, SOLUTION INTRAVENOUS CONTINUOUS
Status: DISCONTINUED | OUTPATIENT
Start: 2021-08-26 | End: 2021-08-26 | Stop reason: HOSPADM

## 2021-08-26 RX ORDER — FENTANYL CITRATE 50 UG/ML
25-200 INJECTION, SOLUTION INTRAMUSCULAR; INTRAVENOUS
Status: DISCONTINUED | OUTPATIENT
Start: 2021-08-26 | End: 2021-08-26 | Stop reason: HOSPADM

## 2021-08-26 RX ORDER — MIDAZOLAM HYDROCHLORIDE 1 MG/ML
.25-5 INJECTION, SOLUTION INTRAMUSCULAR; INTRAVENOUS
Status: DISCONTINUED | OUTPATIENT
Start: 2021-08-26 | End: 2021-08-26 | Stop reason: HOSPADM

## 2021-08-26 RX ORDER — SODIUM CHLORIDE 0.9 % (FLUSH) 0.9 %
5-40 SYRINGE (ML) INJECTION EVERY 8 HOURS
Status: DISCONTINUED | OUTPATIENT
Start: 2021-08-26 | End: 2021-08-26 | Stop reason: HOSPADM

## 2021-08-26 RX ORDER — DEXTROMETHORPHAN/PSEUDOEPHED 2.5-7.5/.8
1.2 DROPS ORAL
Status: DISCONTINUED | OUTPATIENT
Start: 2021-08-26 | End: 2021-08-26 | Stop reason: HOSPADM

## 2021-08-26 RX ORDER — SODIUM CHLORIDE, SODIUM LACTATE, POTASSIUM CHLORIDE, CALCIUM CHLORIDE 600; 310; 30; 20 MG/100ML; MG/100ML; MG/100ML; MG/100ML
INJECTION, SOLUTION INTRAVENOUS
Status: DISCONTINUED | OUTPATIENT
Start: 2021-08-26 | End: 2021-08-26 | Stop reason: HOSPADM

## 2021-08-26 RX ORDER — FLUMAZENIL 0.1 MG/ML
0.2 INJECTION INTRAVENOUS
Status: DISCONTINUED | OUTPATIENT
Start: 2021-08-26 | End: 2021-08-26 | Stop reason: HOSPADM

## 2021-08-26 RX ORDER — EPINEPHRINE 0.1 MG/ML
1 INJECTION INTRACARDIAC; INTRAVENOUS
Status: DISCONTINUED | OUTPATIENT
Start: 2021-08-26 | End: 2021-08-26 | Stop reason: HOSPADM

## 2021-08-26 RX ORDER — SODIUM CHLORIDE 0.9 % (FLUSH) 0.9 %
5-40 SYRINGE (ML) INJECTION AS NEEDED
Status: DISCONTINUED | OUTPATIENT
Start: 2021-08-26 | End: 2021-08-26 | Stop reason: HOSPADM

## 2021-08-26 RX ORDER — PROPOFOL 10 MG/ML
INJECTION, EMULSION INTRAVENOUS AS NEEDED
Status: DISCONTINUED | OUTPATIENT
Start: 2021-08-26 | End: 2021-08-26 | Stop reason: HOSPADM

## 2021-08-26 RX ORDER — LIDOCAINE HYDROCHLORIDE 20 MG/ML
INJECTION, SOLUTION EPIDURAL; INFILTRATION; INTRACAUDAL; PERINEURAL AS NEEDED
Status: DISCONTINUED | OUTPATIENT
Start: 2021-08-26 | End: 2021-08-26 | Stop reason: HOSPADM

## 2021-08-26 RX ADMIN — PROPOFOL 100 MG: 10 INJECTION, EMULSION INTRAVENOUS at 11:54

## 2021-08-26 RX ADMIN — SODIUM CHLORIDE, SODIUM LACTATE, POTASSIUM CHLORIDE, AND CALCIUM CHLORIDE: 600; 310; 30; 20 INJECTION, SOLUTION INTRAVENOUS at 11:42

## 2021-08-26 RX ADMIN — LIDOCAINE HYDROCHLORIDE 60 MG: 20 INJECTION, SOLUTION EPIDURAL; INFILTRATION; INTRACAUDAL; PERINEURAL at 11:46

## 2021-08-26 RX ADMIN — PROPOFOL 50 MG: 10 INJECTION, EMULSION INTRAVENOUS at 11:58

## 2021-08-26 RX ADMIN — PROPOFOL 50 MG: 10 INJECTION, EMULSION INTRAVENOUS at 11:51

## 2021-08-26 RX ADMIN — PROPOFOL 150 MG: 10 INJECTION, EMULSION INTRAVENOUS at 11:46

## 2021-08-26 NOTE — H&P
[address]  [address]      History and Physical       NAME:  Minnie Shay   :   1980   MRN:   [id number]             History of Present Illness:  Patient is a 46 y.o. who is seen for terminal ileitis. PMH:  Past Medical History:   Diagnosis Date    Anxiety     Depression        PSH:  No past surgical history on file. Allergies: Allergies   Allergen Reactions    Compazine [Prochlorperazine] Nausea Only       Home Medications:  Prior to Admission Medications   Prescriptions Last Dose Informant Patient Reported? Taking?   dicyclomine (BENTYL) 20 mg tablet   No No   Sig: Take 1 Tablet by mouth every six (6) hours. ondansetron (ZOFRAN ODT) 8 mg disintegrating tablet   No No   Sig: Take 1 Tab by mouth every eight (8) hours as needed for Nausea. Facility-Administered Medications: None       Hospital Medications:  No current facility-administered medications for this encounter. Social History:  Social History     Tobacco Use    Smoking status: Not on file   Substance Use Topics    Alcohol use: Not on file       Family History:  No family history on file. Review of Systems:      Constitutional: negative fever, negative chills, negative weight loss  Eyes:   negative visual changes  ENT:   negative sore throat, tongue or lip swelling  Respiratory:  negative cough, negative dyspnea  Cards:  negative for chest pain, palpitations, lower extremity edema  GI:   See HPI  :  negative for frequency, dysuria  Integument:  negative for rash and pruritus  Heme:  negative for easy bruising and gum/nose bleeding  Musculoskel: negative for myalgias,  back pain and muscle weakness  Neuro: negative for headaches, dizziness, vertigo  Psych:  negative for feelings of anxiety, depression       Objective:   No data found. No intake/output data recorded. No intake/output data recorded.     EXAM:     NEURO-a&o   HEENT-wnl   LUNGS-clear COR-regular rate and rhythym     ABD-soft , no tenderness, no rebound, good bs     EXT-no edema     Data Review     No results for input(s): WBC, HGB, HCT, PLT, HGBEXT, HCTEXT, PLTEXT in the last 72 hours. No results for input(s): NA, K, CL, CO2, BUN, CREA, GLU, PHOS, CA in the last 72 hours. No results for input(s): AP, TBIL, TP, ALB, GLOB, GGT, AML, LPSE in the last 72 hours. No lab exists for component: SGOT, GPT, AMYP, HLPSE  No results for input(s): INR, PTP, APTT, INREXT in the last 72 hours. Assessment:     · Terminal ileitis     Patient Active Problem List   Diagnosis Code    Crohn's disease of small intestine with complication ([facility] [address] B05.142    Intussusception intestine ([facility] [address] K56.1     Plan:   · The patient was counseled at length about the risks of liya Covid-19 in the broderick-operative and post-operative states including the recovery window of their procedure. The patient was made aware that liya Covid-19 after a surgical procedure may worsen their prognosis for recovering from the virus and lend to a higher morbidity and or mortality risk. The patient was given the options of postponing their procedure. All of the risks, benefits, and alternatives were discussed. The patient does wish to proceed with the procedure. · Endoscopic procedure with MAC     Signed By: Birgit Seo.  Moni Florence MD     8/26/2021  11:18 AM

## 2021-08-26 NOTE — PROCEDURES
[address]  [address]      Colonoscopy Operative Report    Eamon Menendez  [id number]  1980      Procedure Type:   Colonoscopy with biopsy     Indications:  Terminal ileitis based on CT        Pre-operative Diagnosis: see indication above    Post-operative Diagnosis:  See findings below    :  Elissa Maier. Elizabeth Galan MD    Staff: Endoscopy Technician-1: Emilio Jauregui  Endoscopy RN-1: Saurabh Cantu     Referring Provider: None      Sedation:  MAC anesthesia Propofol      Procedure Details:  After informed consent was obtained with all risks and benefits of procedure explained and preoperative exam completed, the patient was taken to the endoscopy suite and placed in the left lateral decubitus position. Upon sequential sedation as per above, a digital rectal exam was performed demonstrating internal hemorrhoids. The Olympus pediatric ultraslim videocolonoscope  was inserted in the rectum and carefully advanced to the terminal ileum. The cecum was identified by the ileocecal valve and appendiceal orifice. The quality of preparation was good. The colonoscope was slowly withdrawn with careful evaluation between folds. Retroflexion in the rectum was completed . Findings:   Rectum: normal  Sigmoid: normal  Descending Colon: normal  Transverse Colon: normal  Ascending Colon: normal  Cecum: normal  Terminal Ileum: normal - the terminal ileum was deeply intubated for 20-30 cm. The mucosa was normal appearing. Multiple cold biopsies were taken      Specimen Removed: 1. Terminal ileum    Complications: None. EBL:  None. Impression:     As above    Recommendations:  1. Follow up surgical pathology  2. Repeat colonoscopy in 10 years    Signed By: Elissa Maier.  Elizabeth Galan MD     8/26/2021  12:05 PM

## 2021-08-26 NOTE — PROGRESS NOTES
Pt stated she was dizzy not nauseated, wanted an Rx for nausea though, advised to contact MD office for a Rx.

## 2021-08-26 NOTE — ROUTINE PROCESS
Saturnino Navarrete  1980  [id number]    Situation:  Verbal report received from: SANDRA Miller   RN  Procedure: Procedure(s):  COLONOSCOPY   :-  COLON BIOPSY    Background:    Preoperative diagnosis: DIARRHEA  Postoperative diagnosis: 1. Terminal Illeitis    :  Dr. Norma Martinez  Assistant(s): Endoscopy Technician-1: Frandy Dias IV  Endoscopy RN-1: Mulu Zamora    Specimens:   ID Type Source Tests Collected by Time Destination   1 : TI Biopsy Preservative Ileum, Terminal  Annabel Mane MD 8/26/2021 1157 Pathology     H. Pylori  no    Assessment:  Intra-procedure medications   Anesthesia gave intra-procedure sedation and medications, see anesthesia flow sheet yes    Intravenous fluids: NS@ KVO     Vital signs stable     Abdominal assessment: round and soft     Recommendation:  Discharge patient per MD order.     Family or Friend   Permission to share finding with family or friend yes

## 2021-08-26 NOTE — DISCHARGE INSTRUCTIONS
[facility]    COLON DISCHARGE INSTRUCTIONS    Carolyn Chapman  [id number]  1980    Discomfort:  Redness at IV site- apply warm compress to area; if redness or soreness persist- contact your physician  There may be a slight amount of blood passed from the rectum  Gaseous discomfort- walking, belching will help relieve any discomfort  You may not operate a vehicle for 12 hours  You may not engage in an occupation involving machinery or appliances for rest of today  You may not drink alcoholic beverages for at least 12 hours  Avoid making any critical decisions for at least 24 hour  DIET:  You may resume your regular diet - however -  remember your colon is empty and a heavy meal will produce gas. Avoid these foods:  vegetables, fried / greasy foods, carbonated drinks     ACTIVITY:  You may  resume your normal daily activities it is recommended that you spend the remainder of the day resting -  avoid any strenuous activity. CALL M.D. ANY SIGN OF:   Increasing pain, nausea, vomiting  Abdominal distension (swelling)  New increased bleeding (oral or rectal)  Fever (chills)  Pain in chest area  Bloody discharge from nose or mouth  Shortness of breath      Follow-up Instructions:   Call Dr. Dean Care for any questions or problems at [address] [place] [address] St:   Your colonoscopy showed a normal appearing colon and terminal ileum. Biopsies were taken. We will contact you about the results and if any additional treatment will be needed. Based on today's findings, it is most likely that you had an infection in your small intestine that has improved with antibiotics. Telephone # [phone]      Signed By: Brandon Bustamante.  Brain Corea MD     8/26/2021  12:03 PM       DISCHARGE SUMMARY from Nurse    The following personal items collected during your admission are returned to you:   Dental Appliance: Dental Appliances: None  Vision: Visual Aid: None  Hearing Aid:    Jewelry:    Clothing:    Other Valuables:    Valuables sent to safe:      Learning About Coronavirus (COVID-19)  Coronavirus (COVID-19): Overview  What is coronavirus (COVID-19)? The coronavirus disease (COVID-19) is caused by a virus. It is an illness that was first found in [place], [place], in December 2019. It has since spread worldwide. The virus can cause fever, cough, and trouble breathing. In severe cases, it can cause pneumonia and make it hard to breathe without help. It can cause death. Coronaviruses are a large group of viruses. They cause the common cold. They also cause more serious illnesses like Middle East respiratory syndrome (MERS) and severe acute respiratory syndrome (SARS). COVID-19 is caused by a novel coronavirus. That means it's a new type that has not been seen in people before. This virus spreads person-to-person through droplets from coughing and sneezing. It can also spread when you are close to someone who is infected. And it can spread when you touch something that has the virus on it, such as a doorknob or a tabletop. What can you do to protect yourself from coronavirus (COVID-19)? The best way to protect yourself from getting sick is to:  · Avoid areas where there is an outbreak. · Avoid contact with people who may be infected. · Wash your hands often with soap or alcohol-based hand sanitizers. · Avoid crowds and try to stay at least 6 feet away from other people. · Wash your hands often, especially after you cough or sneeze. Use soap and water, and scrub for at least 20 seconds. If soap and water aren't available, use an alcohol-based hand . · Avoid touching your mouth, nose, and eyes. What can you do to avoid spreading the virus to others? To help avoid spreading the virus to others:  · Cover your mouth with a tissue when you cough or sneeze. Then throw the tissue in the trash.   · Use a disinfectant to clean things that you touch often.  · Stay home if you are sick or have been exposed to the virus. Don't go to school, work, or public areas. And don't use public transportation. · If you are sick:  ? Leave your home only if you need to get medical care. But call the doctor's office first so they know you're coming. And wear a face mask, if you have one.  ? If you have a face mask, wear it whenever you're around other people. It can help stop the spread of the virus when you cough or sneeze. ? Clean and disinfect your home every day. Use household  and disinfectant wipes or sprays. Take special care to clean things that you grab with your hands. These include doorknobs, remote controls, phones, and handles on your refrigerator and microwave. And don't forget countertops, tabletops, bathrooms, and computer keyboards. When to call for help  Call 911 anytime you think you may need emergency care. For example, call if:  · You have severe trouble breathing. (You can't talk at all.)  · You have constant chest pain or pressure. · You are severely dizzy or lightheaded. · You are confused or can't think clearly. · Your face and lips have a blue color. · You pass out (lose consciousness) or are very hard to wake up. Call your doctor now if you develop symptoms such as:  · Shortness of breath. · Fever. · Cough. If you need to get care, call ahead to the doctor's office for instructions before you go. Make sure you wear a face mask, if you have one, to prevent exposing other people to the virus. Where can you get the latest information? The following health organizations are tracking and studying this virus. Their websites contain the most up-to-date information. La Nena Joshi also learn what to do if you think you may have been exposed to the virus. · U.S. Centers for Disease Control and Prevention (CDC): The CDC provides updated news about the disease and travel advice. The website also tells you how to prevent the spread of infection. www.cdc.gov  · World Health Organization [facility]): WHO offers information about the virus outbreaks. WHO also has travel advice. www.who.int  Current as of: April 1, 2020               Content Version: 12.4  © 5465-5700 Healthwise, Incorporated. Care instructions adapted under license by your healthcare professional. If you have questions about a medical condition or this instruction, always ask your healthcare professional. [address] any warranty or liability for your use of this information.

## 2021-08-26 NOTE — PERIOP NOTES

## 2021-08-26 NOTE — ANESTHESIA POSTPROCEDURE EVALUATION
Post-Anesthesia Evaluation and Assessment    Patient: Yanique Diez MRN: [id number]  SSN: [id number]    YOB: 1980  Age: 46 y.o. Sex: female      I have evaluated the patient and they are stable and ready for discharge from the PACU. Cardiovascular Function/Vital Signs  Visit Vitals  /76   Pulse 67   Temp 36.6 °C (97.8 °F)   Resp 19   Ht 5' 7\" (1.702 m)   Wt 61.2 kg (135 lb)   SpO2 100%   Breastfeeding No   BMI 21.14 kg/m²       Patient is status post MAC anesthesia for Procedure(s):  COLONOSCOPY   :-  COLON BIOPSY. Nausea/Vomiting: None    Postoperative hydration reviewed and adequate. Pain:  Pain Scale 1: Visual (08/26/21 1220)  Pain Intensity 1: 0 (08/26/21 1220)   Managed    Neurological Status: At baseline    Mental Status, Level of Consciousness: Alert and  oriented to person, place, and time    Pulmonary Status:   O2 Device: None (Room air) (08/26/21 1220)   Adequate oxygenation and airway patent    Complications related to anesthesia: None    Post-anesthesia assessment completed. No concerns    Signed By: Sharon Newberry MD     August 26, 2021              Procedure(s):  COLONOSCOPY   :-  COLON BIOPSY. MAC    <BSHSIANPOST>    INITIAL Post-op Vital signs:   Vitals Value Taken Time   /82 08/26/21 1225   Temp     Pulse 71 08/26/21 1227   Resp 19 08/26/21 1227   SpO2 100 % 08/26/21 1227   Vitals shown include unvalidated device data.

## 2022-03-19 PROBLEM — K56.1 INTUSSUSCEPTION INTESTINE (HCC): Status: ACTIVE | Noted: 2021-08-09

## 2022-03-19 PROBLEM — K50.019 CROHN'S DISEASE OF SMALL INTESTINE WITH COMPLICATION (HCC): Status: ACTIVE | Noted: 2021-08-09

## 2022-09-01 ENCOUNTER — HOSPITAL ENCOUNTER (EMERGENCY)
Age: 42
Discharge: HOME | End: 2022-09-01
Attending: EMERGENCY MEDICINE
Payer: MEDICAID

## 2022-09-01 VITALS
RESPIRATION RATE: 18 BRPM | SYSTOLIC BLOOD PRESSURE: 136 MMHG | HEART RATE: 82 BPM | DIASTOLIC BLOOD PRESSURE: 86 MMHG | OXYGEN SATURATION: 98 % | TEMPERATURE: 98.1 F

## 2022-09-01 DIAGNOSIS — R11.2 NAUSEA AND VOMITING, UNSPECIFIED VOMITING TYPE: Primary | ICD-10-CM

## 2022-09-01 LAB
ALBUMIN SERPL-MCNC: 4 G/DL (ref 3.5–5)
ALBUMIN/GLOB SERPL: 1.3 {RATIO} (ref 1.1–2.2)
ALP SERPL-CCNC: 38 U/L (ref 45–117)
ALT SERPL-CCNC: 83 U/L (ref 12–78)
ANION GAP SERPL CALC-SCNC: 9 MMOL/L (ref 5–15)
APPEARANCE UR: CLEAR
AST SERPL-CCNC: 86 U/L (ref 15–37)
ATRIAL RATE: 79 BPM
BACTERIA URNS QL MICRO: ABNORMAL /HPF
BASOPHILS # BLD: 0 K/UL (ref 0–0.1)
BASOPHILS NFR BLD: 1 % (ref 0–1)
BILIRUB SERPL-MCNC: 0.9 MG/DL (ref 0.2–1)
BILIRUB UR QL CFM: NEGATIVE
BUN SERPL-MCNC: 10 MG/DL (ref 6–20)
BUN/CREAT SERPL: 15 (ref 12–20)
CALCIUM SERPL-MCNC: 9.5 MG/DL (ref 8.5–10.1)
CALCULATED P AXIS, ECG09: -7 DEGREES
CALCULATED R AXIS, ECG10: 57 DEGREES
CALCULATED T AXIS, ECG11: 10 DEGREES
CHLORIDE SERPL-SCNC: 101 MMOL/L (ref 97–108)
CO2 SERPL-SCNC: 26 MMOL/L (ref 21–32)
COLOR UR: ABNORMAL
COMMENT, HOLDF: NORMAL
CREAT SERPL-MCNC: 0.66 MG/DL (ref 0.55–1.02)
DIAGNOSIS, 93000: NORMAL
DIFFERENTIAL METHOD BLD: ABNORMAL
EOSINOPHIL # BLD: 0 K/UL (ref 0–0.4)
EOSINOPHIL NFR BLD: 1 % (ref 0–7)
EPITH CASTS URNS QL MICRO: ABNORMAL /LPF
ERYTHROCYTE [DISTWIDTH] IN BLOOD BY AUTOMATED COUNT: 15.5 % (ref 11.5–14.5)
GLOBULIN SER CALC-MCNC: 3.1 G/DL (ref 2–4)
GLUCOSE SERPL-MCNC: 94 MG/DL (ref 65–100)
GLUCOSE UR STRIP.AUTO-MCNC: NEGATIVE MG/DL
HCG UR QL: NEGATIVE
HCT VFR BLD AUTO: 39.3 % (ref 35–47)
HGB BLD-MCNC: 12.9 G/DL (ref 11.5–16)
HGB UR QL STRIP: NEGATIVE
HYALINE CASTS URNS QL MICRO: ABNORMAL /LPF (ref 0–5)
IMM GRANULOCYTES # BLD AUTO: 0 K/UL (ref 0–0.04)
IMM GRANULOCYTES NFR BLD AUTO: 0 % (ref 0–0.5)
KETONES UR QL STRIP.AUTO: 40 MG/DL
LEUKOCYTE ESTERASE UR QL STRIP.AUTO: ABNORMAL
LIPASE SERPL-CCNC: 190 U/L (ref 73–393)
LYMPHOCYTES # BLD: 0.8 K/UL (ref 0.8–3.5)
LYMPHOCYTES NFR BLD: 18 % (ref 12–49)
MAGNESIUM SERPL-MCNC: 1.9 MG/DL (ref 1.6–2.4)
MCH RBC QN AUTO: 28.2 PG (ref 26–34)
MCHC RBC AUTO-ENTMCNC: 32.8 G/DL (ref 30–36.5)
MCV RBC AUTO: 85.8 FL (ref 80–99)
MONOCYTES # BLD: 0.4 K/UL (ref 0–1)
MONOCYTES NFR BLD: 8 % (ref 5–13)
NEUTS SEG # BLD: 3.2 K/UL (ref 1.8–8)
NEUTS SEG NFR BLD: 72 % (ref 32–75)
NITRITE UR QL STRIP.AUTO: NEGATIVE
NRBC # BLD: 0 K/UL (ref 0–0.01)
NRBC BLD-RTO: 0 PER 100 WBC
P-R INTERVAL, ECG05: 118 MS
PH UR STRIP: 7.5 [PH] (ref 5–8)
PLATELET # BLD AUTO: 264 K/UL (ref 150–400)
PMV BLD AUTO: 9 FL (ref 8.9–12.9)
POTASSIUM SERPL-SCNC: 3.3 MMOL/L (ref 3.5–5.1)
PROT SERPL-MCNC: 7.1 G/DL (ref 6.4–8.2)
PROT UR STRIP-MCNC: 30 MG/DL
Q-T INTERVAL, ECG07: 402 MS
QRS DURATION, ECG06: 90 MS
QTC CALCULATION (BEZET), ECG08: 460 MS
RBC # BLD AUTO: 4.58 M/UL (ref 3.8–5.2)
RBC #/AREA URNS HPF: ABNORMAL /HPF (ref 0–5)
RBC MORPH BLD: ABNORMAL
SAMPLES BEING HELD,HOLD: NORMAL
SODIUM SERPL-SCNC: 136 MMOL/L (ref 136–145)
SP GR UR REFRACTOMETRY: 1.02 (ref 1–1.03)
TROPONIN-HIGH SENSITIVITY: <4 NG/L (ref 0–51)
UR CULT HOLD, URHOLD: NORMAL
UROBILINOGEN UR QL STRIP.AUTO: 1 EU/DL (ref 0.2–1)
VENTRICULAR RATE, ECG03: 79 BPM
WBC # BLD AUTO: 4.4 K/UL (ref 3.6–11)
WBC MORPH BLD: ABNORMAL
WBC URNS QL MICRO: ABNORMAL /HPF (ref 0–4)

## 2022-09-01 PROCEDURE — 81001 URINALYSIS AUTO W/SCOPE: CPT

## 2022-09-01 PROCEDURE — 81025 URINE PREGNANCY TEST: CPT

## 2022-09-01 PROCEDURE — 80053 COMPREHEN METABOLIC PANEL: CPT

## 2022-09-01 PROCEDURE — 96375 TX/PRO/DX INJ NEW DRUG ADDON: CPT

## 2022-09-01 PROCEDURE — 93005 ELECTROCARDIOGRAM TRACING: CPT

## 2022-09-01 PROCEDURE — 85025 COMPLETE CBC W/AUTO DIFF WBC: CPT

## 2022-09-01 PROCEDURE — 96361 HYDRATE IV INFUSION ADD-ON: CPT

## 2022-09-01 PROCEDURE — 99284 EMERGENCY DEPT VISIT MOD MDM: CPT

## 2022-09-01 PROCEDURE — 96374 THER/PROPH/DIAG INJ IV PUSH: CPT

## 2022-09-01 PROCEDURE — 74011250636 HC RX REV CODE- 250/636: Performed by: NURSE PRACTITIONER

## 2022-09-01 PROCEDURE — 83735 ASSAY OF MAGNESIUM: CPT

## 2022-09-01 PROCEDURE — 36415 COLL VENOUS BLD VENIPUNCTURE: CPT

## 2022-09-01 PROCEDURE — 83690 ASSAY OF LIPASE: CPT

## 2022-09-01 PROCEDURE — 84484 ASSAY OF TROPONIN QUANT: CPT

## 2022-09-01 RX ORDER — SODIUM CHLORIDE 9 MG/ML
1000 INJECTION, SOLUTION INTRAVENOUS ONCE
Status: COMPLETED | OUTPATIENT
Start: 2022-09-01 | End: 2022-09-01

## 2022-09-01 RX ORDER — NALTREXONE HYDROCHLORIDE 50 MG/1
TABLET, FILM COATED ORAL
COMMUNITY
Start: 2022-07-28 | End: 2022-09-27 | Stop reason: SDUPTHER

## 2022-09-01 RX ORDER — KETOROLAC TROMETHAMINE 30 MG/ML
15 INJECTION, SOLUTION INTRAMUSCULAR; INTRAVENOUS
Status: COMPLETED | OUTPATIENT
Start: 2022-09-01 | End: 2022-09-01

## 2022-09-01 RX ORDER — ONDANSETRON 4 MG/1
4 TABLET, FILM COATED ORAL
Qty: 20 TABLET | Refills: 0 | Status: SHIPPED | OUTPATIENT
Start: 2022-09-01 | End: 2022-09-27 | Stop reason: ALTCHOICE

## 2022-09-01 RX ORDER — BUPROPION HYDROCHLORIDE 150 MG/1
TABLET, EXTENDED RELEASE ORAL
COMMUNITY
Start: 2022-07-28 | End: 2022-09-27 | Stop reason: SDUPTHER

## 2022-09-01 RX ORDER — GABAPENTIN 300 MG/1
CAPSULE ORAL
COMMUNITY
Start: 2022-06-27 | End: 2022-09-27 | Stop reason: ALTCHOICE

## 2022-09-01 RX ORDER — ONDANSETRON 2 MG/ML
4 INJECTION INTRAMUSCULAR; INTRAVENOUS
Status: COMPLETED | OUTPATIENT
Start: 2022-09-01 | End: 2022-09-01

## 2022-09-01 RX ADMIN — SODIUM CHLORIDE 1000 ML: 9 INJECTION, SOLUTION INTRAVENOUS at 09:38

## 2022-09-01 RX ADMIN — ONDANSETRON 4 MG: 2 INJECTION INTRAMUSCULAR; INTRAVENOUS at 09:38

## 2022-09-01 RX ADMIN — KETOROLAC TROMETHAMINE 15 MG: 30 INJECTION, SOLUTION INTRAMUSCULAR; INTRAVENOUS at 09:38

## 2022-09-01 NOTE — ED NOTES
Pt provided with discharge instructions. Verbalized understanding plan of care. IV DC. Pt ambulatory to waiting room.

## 2022-09-01 NOTE — DISCHARGE INSTRUCTIONS
Please establish care with primary care provider. If you have difficulty getting an with who you have scheduled with refer to the list provided. Eat a bland diet with no fried foods or fatty foods until feeling better, ensure that you are drinking an adequate amount of fluids to include electrolytes such as Pedialyte, body armor or Gatorade. [facility] Departments     For adult and child immunizations, family planning, TB screening, STD testing and women's health services. [facility]: [place] [phone]      [facility] 25   657 [facility]   [address]   170 [facility]: [facility] [address]  [phone] 2400 [facility]          [address] [address]     For primary care services, woman and child wellness, and some clinics providing specialty care. [facility] -- 1011 [facility]. [address] [place] [address] [phone] 411 [facility] 200 [facility] 3614 [facility] [phone]   339 [facility] [address] [place] [address] [phone]   [address] [facility] [address] [place] [address] 5850 [facility]  [phone]   [address] [place] [address] [phone]   [facility] 81 [facility] [phone]   [address] [phone]   [facility]: [facility] saira Reza [address] [place] [address], [address] [phone]     [facility] [address] [facility] [address] [phone]   [facility] 5850  Community  [phone]   [employer]  [address] ([email or web address]) [phone]         Sexual Health/Woman Wellness Clinics    For STD/HIV testing and treatment, pregnancy testing and services, men's health, birth control services, LGBT services, and hepatitis/HPV vaccine services. [facility] Racquel sahni [place] [employer] [address]  [facility] 603 S [facility] [address] [facility] 1579 [address] [phone]   [facility] [address], 5th floor [phone]   Pregnancy [address] [facility] [address]  [place] [phone]         [place] [address]67 [facility] [address] [place] [address]   [phone]   [place]   [phone]   Women, Infant and Children's Services: [address] [phone]       600 [facility]   [phone]   [facility] [facility] [address]   [phone]   [facility]     [phone]   [address] Crisis   1212 [facility] [phone]       Local Primary Care Physicians  [facility] [phone]  MD Jarrell Brown MD Cole Hem, MD [facility] Doctors [phone]  Nakul Caballero, MD Maggie Sykes MD Venida Amis, MD [address] [address] [phone]  MD Alissa Loja [place] [address] [facility] [phone]  MD Virginia Coleman MD Auston Late, MD Pattie Betters, MD   [facility] [phone]  WQYM ZMFXKB GIA, MD Stephen Murphy, NP [address] [phone]  MD Georgiana Tafoya MD Jefm Landmark, MD Wylene Cornet, MD Juanetta Ebbing, MD Isidor Negri, MD Suan Quivers, MD   8618 [facility] [phone]  Reina Miramontes MD [facility] [phone]  MD Nikolai Nguyen, NP  MD Pedrito Garland MD Sean Slovak, MD Kathrin Anger, MD Kyra Dapper, MD   [address] [place] [address] [phone]  MD Sam Helms, FNP  Samantha Chapa, NP  Charmayne Irani, MD Carlyon Mcardle, MD Dean Mccoy, MD Barb Azevedo, MD ARTEAGA [facility] [phone]  Tess Banegas, MD Corin Azar, MD Ricki Farmer, MD Pastor Wilkins, MD Gabriel Riggs, MD   [facility] [phone]  Flip Marx, MD Amirah Morgan [phone]  MD Brett Mercado, MD Flynn Crane, MD   [facility] [phone]  [facility] Dean, MD Janelle Renae, MD Milind Little, MD Chalino Levine, MD Maryam Adamson, MD Kaitlin Ayala, NP  Anneliese Angel MD [address]   [phone]  MD Tree Carmichael, MD Noreen Gordon MD   2102 [facility] [phone]  [facility]en [facility], MD Renea Farah, VIRIP  Morgan Anderson, PAEUSEBIA Anderson, FNP  Kiya Rogers, ARVIN Galvez, MD Chrissie Montiel, JEFFREY [place],  Miscellaneous:  Romilda Hodgkin, [phone]

## 2022-09-01 NOTE — ED PROVIDER NOTES
37 y/o female with PMHx anxiety and depression presents ambulatory with c/o decreased appetite, vomiting x 3 days, tried to drink fluids without success, patient states that she recently got back from [place] and had an increased amount of alcohol while on the trip. Denies SI/HI, states that she has outpatient resources and has an appt. The end of September to get established with PCP. Associated symptoms include dizzyness, chest congestion and mild headache. Denies fever , chills, CP, SOB, blood in urine or stool, vaginal discharge, abdominal pain, dysuria, abnormal bleeding, denies need to speak to [facility] MEDICAL counselor at this time. Past Medical History:   Diagnosis Date    Anxiety     Depression        Past Surgical History:   Procedure Laterality Date    COLONOSCOPY N/A 8/26/2021    COLONOSCOPY   :- performed by Alicia Mcclain MD at [facility] ENDOSCOPY         History reviewed. No pertinent family history. Social History     Socioeconomic History    Marital status:      Spouse name: Not on file    Number of children: Not on file    Years of education: Not on file    Highest education level: Not on file   Occupational History    Not on file   Tobacco Use    Smoking status: Some Days     Types: Cigarettes    Smokeless tobacco: Never   Substance and Sexual Activity    Alcohol use: Yes     Comment: 1 drink a day. Drug use: Not Currently    Sexual activity: Not on file   Other Topics Concern    Not on file   Social History Narrative    Not on file     Social Determinants of Health     Financial Resource Strain: Not on file   Food Insecurity: Not on file   Transportation Needs: Not on file   Physical Activity: Not on file   Stress: Not on file   Social Connections: Not on file   Intimate Partner Violence: Not on file   Housing Stability: Not on file         ALLERGIES: Compazine [prochlorperazine]    Review of Systems   Constitutional:  Negative for chills and fever.    HENT:  Negative for congestion. Respiratory:  Negative for cough and shortness of breath. Cardiovascular:  Negative for chest pain. Gastrointestinal:  Positive for nausea and vomiting. Negative for abdominal pain, blood in stool and constipation. Genitourinary:  Negative for difficulty urinating, dysuria, vaginal bleeding and vaginal discharge. Skin:  Negative for color change and pallor. Neurological:  Positive for dizziness and headaches. Negative for light-headedness. Psychiatric/Behavioral:  Negative for suicidal ideas. Tearful     Vitals:    09/01/22 0540   BP: (!) 152/100   Pulse: 81   Resp: 18   Temp: 98 °F (36.7 °C)   SpO2: 97%            Physical Exam  Vitals and nursing note reviewed. Constitutional:       Appearance: Normal appearance. HENT:      Head: Normocephalic. Nose:      Comments: Mask in place. Cardiovascular:      Rate and Rhythm: Normal rate. Pulses: Normal pulses. Pulmonary:      Effort: Pulmonary effort is normal. No respiratory distress. Abdominal:      General: Abdomen is flat. Bowel sounds are normal. There is no distension. Palpations: Abdomen is soft. Tenderness: There is no abdominal tenderness. There is no right CVA tenderness, left CVA tenderness, guarding or rebound. Musculoskeletal:         General: Normal range of motion. Cervical back: Normal range of motion. Skin:     General: Skin is warm and dry. Neurological:      Mental Status: She is alert and oriented to person, place, and time. Psychiatric:         Mood and Affect: Mood normal. Affect is tearful.         MDM  Number of Diagnoses or Management Options  Nausea and vomiting, unspecified vomiting type: established and improving  Diagnosis management comments: Differential DX: UTI vs dehydration vs substance abuse       Amount and/or Complexity of Data Reviewed  Clinical lab tests: ordered and reviewed      ED Course as of 09/01/22 1132   Thu Sep 01, 2022   0603 ED EKG interpretation:6:03 AM  Rhythm: normal sinus rhythm;  Rate (approx.): 79; Axis: normal; QRS interval: normal ; ST/T wave: normal; Other findings: normal.    [JW]      ED Course User Index  [JW] Lisa Ramirez MD       Procedures    VITAL SIGNS:  No data found. LABS:  Recent Results (from the past 6 hour(s))   CBC WITH AUTOMATED DIFF    Collection Time: 09/01/22  5:51 AM   Result Value Ref Range    WBC 4.4 3.6 - 11.0 K/uL    RBC 4.58 3.80 - 5.20 M/uL    HGB 12.9 11.5 - 16.0 g/dL    HCT 39.3 35.0 - 47.0 %    MCV 85.8 80.0 - 99.0 FL    MCH 28.2 26.0 - 34.0 PG    MCHC 32.8 30.0 - 36.5 g/dL    RDW 15.5 (H) 11.5 - 14.5 %    PLATELET 050 336 - 299 K/uL    MPV 9.0 8.9 - 12.9 FL    NRBC 0.0 0  WBC    ABSOLUTE NRBC 0.00 0.00 - 0.01 K/uL    NEUTROPHILS 72 32 - 75 %    LYMPHOCYTES 18 12 - 49 %    MONOCYTES 8 5 - 13 %    EOSINOPHILS 1 0 - 7 %    BASOPHILS 1 0 - 1 %    IMMATURE GRANULOCYTES 0 0.0 - 0.5 %    ABS. NEUTROPHILS 3.2 1.8 - 8.0 K/UL    ABS. LYMPHOCYTES 0.8 0.8 - 3.5 K/UL    ABS. MONOCYTES 0.4 0.0 - 1.0 K/UL    ABS. EOSINOPHILS 0.0 0.0 - 0.4 K/UL    ABS. BASOPHILS 0.0 0.0 - 0.1 K/UL    ABS. IMM. GRANS. 0.0 0.00 - 0.04 K/UL    DF SMEAR SCANNED      RBC COMMENTS ANISOCYTOSIS  1+        WBC COMMENTS REACTIVE LYMPHS     METABOLIC PANEL, COMPREHENSIVE    Collection Time: 09/01/22  5:51 AM   Result Value Ref Range    Sodium 136 136 - 145 mmol/L    Potassium 3.3 (L) 3.5 - 5.1 mmol/L    Chloride 101 97 - 108 mmol/L    CO2 26 21 - 32 mmol/L    Anion gap 9 5 - 15 mmol/L    Glucose 94 65 - 100 mg/dL    BUN 10 6 - 20 MG/DL    Creatinine 0.66 0.55 - 1.02 MG/DL    BUN/Creatinine ratio 15 12 - 20      GFR est AA >60 >60 ml/min/1.73m2    GFR est non-AA >60 >60 ml/min/1.73m2    Calcium 9.5 8.5 - 10.1 MG/DL    Bilirubin, total 0.9 0.2 - 1.0 MG/DL    ALT (SGPT) 83 (H) 12 - 78 U/L    AST (SGOT) 86 (H) 15 - 37 U/L    Alk.  phosphatase 38 (L) 45 - 117 U/L    Protein, total 7.1 6.4 - 8.2 g/dL    Albumin 4.0 3.5 - 5.0 g/dL Globulin 3.1 2.0 - 4.0 g/dL    A-G Ratio 1.3 1.1 - 2.2     SAMPLES BEING HELD    Collection Time: 09/01/22  5:51 AM   Result Value Ref Range    SAMPLES BEING HELD 1red 1blu     COMMENT        Add-on orders for these samples will be processed based on acceptable specimen integrity and analyte stability, which may vary by analyte. MAGNESIUM    Collection Time: 09/01/22  5:51 AM   Result Value Ref Range    Magnesium 1.9 1.6 - 2.4 mg/dL   LIPASE    Collection Time: 09/01/22  5:51 AM   Result Value Ref Range    Lipase 190 73 - 393 U/L   TROPONIN-HIGH SENSITIVITY    Collection Time: 09/01/22  5:51 AM   Result Value Ref Range    Troponin-High Sensitivity <4 0 - 51 ng/L   URINALYSIS W/MICROSCOPIC    Collection Time: 09/01/22  8:01 AM   Result Value Ref Range    Color DARK YELLOW      Appearance CLEAR CLEAR      Specific gravity 1.025 1.003 - 1.030      pH (UA) 7.5 5.0 - 8.0      Protein 30 (A) NEG mg/dL    Glucose Negative NEG mg/dL    Ketone 40 (A) NEG mg/dL    Blood Negative NEG      Urobilinogen 1.0 0.2 - 1.0 EU/dL    Nitrites Negative NEG      Leukocyte Esterase TRACE (A) NEG      WBC 0-4 0 - 4 /hpf    RBC 0-5 0 - 5 /hpf    Epithelial cells MODERATE (A) FEW /lpf    Bacteria 1+ (A) NEG /hpf    Hyaline cast 2-5 0 - 5 /lpf   URINE CULTURE HOLD SAMPLE    Collection Time: 09/01/22  8:01 AM    Specimen: Serum; Urine   Result Value Ref Range    Urine culture hold        Urine on hold in Microbiology dept for 2 days. If unpreserved urine is submitted, it cannot be used for addtional testing after 24 hours, recollection will be required.    HCG URINE, QL    Collection Time: 09/01/22  8:01 AM   Result Value Ref Range    HCG urine, QL Negative NEG     BILIRUBIN, CONFIRM    Collection Time: 09/01/22  8:01 AM   Result Value Ref Range    Bilirubin UA, confirm Negative          IMAGING:  No orders to display         Medications During Visit:  Medications   0.9% sodium chloride infusion 1,000 mL (1,000 mL IntraVENous New Bag 9/1/22 0615)   [facility]) injection 4 mg (4 mg IntraVENous Given 9/1/22 0938)   ketorolac (TORADOL) injection 15 mg (15 mg IntraVENous Given 9/1/22 0938)         DECISION MAKING:  Alayna Pérez is a 38 y.o. female who comes in as above. c/o decreased appetite, vomiting x 3 days, tried to drink fluids without success, patient states that she recently got back from [place] and had an increased amount of alcohol while on the trip. Denies SI/HI, states that she has outpatient resources and has an appt. The end of September to get established with PCP. Associated symptoms include dizzyness, chest congestion and mild headache. Denies fever , chills, CP, SOB, blood in urine or stool, vaginal discharge, abdominal pain, dysuria, abnormal bleeding, denies need to speak to [facility] MEDICAL counselor at this time. Discussed all lab results and EKG, most likely vomiting is a result of too much alcohol intake after discussion with patient, will hydrate and give nausea medication, re-evaluate. 1100-patient reevaluated, tolerating p.o. intake without nausea or vomiting. Potassium noted to be 3.3, encouraged adding additional dietary items for resupplementation, urine with 40 ketones, protein 30, trace leukocyte no WBC and 1+ bacteria, will send urine culture. Patient denies urinary discomfort or symptoms at this time. Discussed with patient follow-up with PCP, patient remains hemodynamically stable, verbalized understanding agrees with plan. States that she no longer feels lightheaded or dizzy after fluid replacement. IMPRESSION:  1. Nausea and vomiting, unspecified vomiting type        DISPOSITION:  Discharged      Current Discharge Medication List        START taking these medications    Details   ondansetron hcl (Zofran) 4 mg tablet Take 1 Tablet by mouth every eight (8) hours as needed for Nausea or Vomiting.   Qty: 20 Tablet, Refills: 0  Start date: 9/1/2022              Follow-up Information       Follow up With Specialties Details Why Contact Info    [facility] EMERGENCY DEP Emergency Medicine  If symptoms worsen Mirella Singh  [phone]              The patient is asked to follow-up with their primary care provider in the next several days. They are to call tomorrow for an appointment. The patient is asked to return promptly for any increased concerns or worsening of symptoms. They can return to this emergency department or any other emergency department.     Amy Janeal Dance, NP11:36 AM

## 2022-09-01 NOTE — ED TRIAGE NOTES
Patient arrives ambulatory from home with CC of decreased appetite and vomiting x3 days. Patient reports feeling dizzy +nausea and constipation. Also endorses \"a little\" chest discomfort. Denies abdominal pain. Normal gait / station

## 2022-09-27 ENCOUNTER — OFFICE VISIT (OUTPATIENT)
Dept: INTERNAL MEDICINE CLINIC | Age: 42
End: 2022-09-27
Payer: MEDICAID

## 2022-09-27 VITALS
RESPIRATION RATE: 18 BRPM | SYSTOLIC BLOOD PRESSURE: 138 MMHG | HEIGHT: 67 IN | WEIGHT: 136.5 LBS | OXYGEN SATURATION: 100 % | TEMPERATURE: 97.7 F | HEART RATE: 71 BPM | BODY MASS INDEX: 21.43 KG/M2 | DIASTOLIC BLOOD PRESSURE: 88 MMHG

## 2022-09-27 DIAGNOSIS — F10.11 MILD ALCOHOL USE DISORDER, IN SUSTAINED REMISSION: Primary | ICD-10-CM

## 2022-09-27 DIAGNOSIS — Z76.89 ENCOUNTER TO ESTABLISH CARE: ICD-10-CM

## 2022-09-27 DIAGNOSIS — R41.840 DIFFICULTY CONCENTRATING: ICD-10-CM

## 2022-09-27 DIAGNOSIS — F31.9 BIPOLAR DEPRESSION (HCC): ICD-10-CM

## 2022-09-27 DIAGNOSIS — E86.0 DEHYDRATION: ICD-10-CM

## 2022-09-27 DIAGNOSIS — Z97.5 IUD (INTRAUTERINE DEVICE) IN PLACE: ICD-10-CM

## 2022-09-27 DIAGNOSIS — R68.89 FORGETFULNESS: ICD-10-CM

## 2022-09-27 PROCEDURE — 99204 OFFICE O/P NEW MOD 45 MIN: CPT | Performed by: NURSE PRACTITIONER

## 2022-09-27 RX ORDER — DOXEPIN HYDROCHLORIDE 6 MG/1
6 TABLET ORAL
Qty: 30 TABLET | Refills: 2 | Status: SHIPPED | OUTPATIENT
Start: 2022-09-27

## 2022-09-27 RX ORDER — NALTREXONE HYDROCHLORIDE 50 MG/1
TABLET, FILM COATED ORAL
Qty: 90 TABLET | Refills: 0 | Status: SHIPPED | OUTPATIENT
Start: 2022-09-27

## 2022-09-27 RX ORDER — BUSPIRONE HYDROCHLORIDE 30 MG/1
TABLET ORAL
Qty: 180 TABLET | Refills: 1 | Status: SHIPPED | OUTPATIENT
Start: 2022-09-27

## 2022-09-27 RX ORDER — BUPROPION HYDROCHLORIDE 150 MG/1
TABLET, EXTENDED RELEASE ORAL
Status: CANCELLED | OUTPATIENT
Start: 2022-09-27

## 2022-09-27 RX ORDER — BUSPIRONE HYDROCHLORIDE 30 MG/1
TABLET ORAL
COMMUNITY
Start: 2022-07-28 | End: 2022-09-27 | Stop reason: SDUPTHER

## 2022-09-27 RX ORDER — BUPROPION HYDROCHLORIDE 300 MG/1
300 TABLET ORAL
Qty: 90 TABLET | Refills: 1 | Status: SHIPPED | OUTPATIENT
Start: 2022-09-27

## 2022-09-27 NOTE — PROGRESS NOTES
Pt is here for   Chief Complaint   Patient presents with    New Patient     Establishing care     ED Follow-up     9/1/2022 [facility] for loss of appetite, nausea      1. \"Have you been to the ER, urgent care clinic since your last visit? Hospitalized since your last visit? \" Yes When: 9/1/2022 [facility] for loss of appetite and nausea    2. \"Have you seen or consulted any other health care providers outside of the [address] [place] [address] since your last visit? \" No     3. For patients aged 39-70: Has the patient had a colonoscopy / FIT/ Cologuard? Yes - Care Gap present. Most recent result on file      If the patient is female:    4. For patients aged 41-77: Has the patient had a mammogram within the past 2 years? Yes - Care Gap present. Most recent result on file      5. For patients aged 21-65: Has the patient had a pap smear?  No

## 2022-09-27 NOTE — PROGRESS NOTES
Jim Severance (: 1980) is a 38 y.o. female, new patient, here for evaluation of the following chief complaint(s):  New Patient (Establishing care ) and ED Follow-up (2022 [facility] for loss of appetite, nausea )       ASSESSMENT/PLAN:  Below is the assessment and plan developed based on review of pertinent history, physical exam, labs, studies, and medications. 1. Mild alcohol use disorder, in sustained remission  -     naltrexone (DEPADE) 50 mg tablet; TAKE 1 TABLET BY MOUTH EVERY DAY, Normal, Disp-90 Tablet, R-0  2. Forgetfulness  -     REFERRAL TO PSYCHIATRY  3. Difficulty concentrating  -     REFERRAL TO PSYCHIATRY  4. Encounter to establish care  5. Bipolar depression (HCC)  -     REFERRAL TO PSYCHIATRY  -     busPIRone (BUSPAR) 30 mg tablet; TAKE 1 TABLET BY MOUTH TWICE A DAY, Normal, Disp-180 Tablet, R-1  6. IUD (intrauterine device) in place  -     REFERRAL TO GYNECOLOGY  7. Dehydration    Return in about 3 months (around 2022) for Physical with labs, GYN/Psych fu.      Pt asked to complete follow by next visit: since pt forgetting second dosing for all meds, changed all to once daily dosing a current strength. Advised she may resume BID naltrexone if ETOH use increases and resume BID buspar if anxiety worsens. Sent to GYN for IUD removal and to [facility] for ADD testing and med mgt for bipolar. SUBJECTIVE/OBJECTIVE:  HPI    Pt here to establish care. Pt was seen in ER on  for N/V. Diagnosed with dehydration. Was given IVF and labs. Instructed to f/u with PCP. Reports feeling BETTER THAN  when in ER. Has had in the past, but seemingly due to ETOH intake. Averaging ~ 2-3 drinks weekly now, but heavier during hospital admission. At worst, had ETOH daily, but not for a while. Followed by addiction medicine with Rx wellbutrin, naltrexone, and buspar with [address]. Used OLGA in the past ETOH, but felt it altered her focus. Told she had ADD and Bipolar.  Sleeping 6-7 hours most night, but interrupted after 4 hours with brief waking period of 30 minutes.  Returns to sleep.   3 most recent PHQ Screens 9/27/2022   Little interest or pleasure in doing things Not at all   Feeling down, depressed, irritable, or hopeless Not at all   Total Score PHQ 2 0     МАРИЯ 2/7 9/27/2022   Feeling nervous, anxious, or on edge 1   Not being able to stop or control worrying 1   Worrying too much about different things 2   Trouble relaxing 1   Being so restless that it is hard to sit still 2   Becoming easily annoyed or irritable 1   Feeling afraid as if something awful might happen 1   МАРИЯ-7 Total Score 9         Review of Systems  Constitutional: negative for fevers, chills, anorexia and weight loss  Respiratory:  negative for cough, hemoptysis, dyspnea, and wheezing  CV:   negative for chest pain, palpitations, and lower extremity edema  GI:   negative for nausea, vomiting, diarrhea, abdominal pain, and melena  Endo:               negative for polyuria,polydipsia,polyphagia, and heat intolerance  Genitourinary: negative for frequency, urgency, dysuria, retention, and hematuria  Integument:  negative for rash, ulcerations, and pruritus  Hematologic:  negative for easy bruising and bleeding  Musculoskel: negative for arthralgias, muscle weakness,and joint pain/swelling  Neurological:  negative for headaches, dizziness, vertigo,and memory/gait problems  Behavl/Psych: negative for feelings of anxiety, depression, suicide, and mood changes    Visit Vitals  /88 (BP 1 Location: Right upper arm, BP Patient Position: Sitting, BP Cuff Size: Adult)   Pulse 71   Temp 97.7 °F (36.5 °C) (Temporal)   Resp 18   Ht 5' 7\" (1.702 m)   Wt 136 lb 8 oz (61.9 kg)   LMP 09/10/2022 (Approximate)   SpO2 100%   BMI 21.38 kg/m²       Wt Readings from Last 3 Encounters:   09/27/22 136 lb 8 oz (61.9 kg)   08/26/21 135 lb (61.2 kg)   08/09/21 135 lb (61.2 kg)         Physical Exam:   General appearance - alert, well appearing, and in no distress. Mental status - A/O x 4,normal mood and affect. Chest - CTA. Symmetric chest rise. No wheezing. No distress. Heart - Normal rate & rhythm. Normal S1 & S2. No MGR. Abdomen- Soft, round. Non-distended, NT. No pulsatile masses or hernias. Ext-  No pedal edema, clubbing, or cyanosis. Skin-Warm and dry. No hyperpigmentation, ulcerations, or suspicious lesions. Neuro - Normal speech, no focal findings or movement disorder. Normal strength, gait, and muscle tone. Results for orders placed or performed during the hospital encounter of 09/01/22   URINE CULTURE HOLD SAMPLE    Specimen: Serum; Urine   Result Value Ref Range    Urine culture hold        Urine on hold in Microbiology dept for 2 days. If unpreserved urine is submitted, it cannot be used for addtional testing after 24 hours, recollection will be required. CBC WITH AUTOMATED DIFF   Result Value Ref Range    WBC 4.4 3.6 - 11.0 K/uL    RBC 4.58 3.80 - 5.20 M/uL    HGB 12.9 11.5 - 16.0 g/dL    HCT 39.3 35.0 - 47.0 %    MCV 85.8 80.0 - 99.0 FL    MCH 28.2 26.0 - 34.0 PG    MCHC 32.8 30.0 - 36.5 g/dL    RDW 15.5 (H) 11.5 - 14.5 %    PLATELET 970 498 - 194 K/uL    MPV 9.0 8.9 - 12.9 FL    NRBC 0.0 0  WBC    ABSOLUTE NRBC 0.00 0.00 - 0.01 K/uL    NEUTROPHILS 72 32 - 75 %    LYMPHOCYTES 18 12 - 49 %    MONOCYTES 8 5 - 13 %    EOSINOPHILS 1 0 - 7 %    BASOPHILS 1 0 - 1 %    IMMATURE GRANULOCYTES 0 0.0 - 0.5 %    ABS. NEUTROPHILS 3.2 1.8 - 8.0 K/UL    ABS. LYMPHOCYTES 0.8 0.8 - 3.5 K/UL    ABS. MONOCYTES 0.4 0.0 - 1.0 K/UL    ABS. EOSINOPHILS 0.0 0.0 - 0.4 K/UL    ABS. BASOPHILS 0.0 0.0 - 0.1 K/UL    ABS. IMM.  GRANS. 0.0 0.00 - 0.04 K/UL    DF SMEAR SCANNED      RBC COMMENTS ANISOCYTOSIS  1+        WBC COMMENTS REACTIVE LYMPHS     METABOLIC PANEL, COMPREHENSIVE   Result Value Ref Range    Sodium 136 136 - 145 mmol/L    Potassium 3.3 (L) 3.5 - 5.1 mmol/L    Chloride 101 97 - 108 mmol/L    CO2 26 21 - 32 mmol/L    Anion gap 9 5 - 15 mmol/L Glucose 94 65 - 100 mg/dL    BUN 10 6 - 20 MG/DL    Creatinine 0.66 0.55 - 1.02 MG/DL    BUN/Creatinine ratio 15 12 - 20      GFR est AA >60 >60 ml/min/1.73m2    GFR est non-AA >60 >60 ml/min/1.73m2    Calcium 9.5 8.5 - 10.1 MG/DL    Bilirubin, total 0.9 0.2 - 1.0 MG/DL    ALT (SGPT) 83 (H) 12 - 78 U/L    AST (SGOT) 86 (H) 15 - 37 U/L    Alk. phosphatase 38 (L) 45 - 117 U/L    Protein, total 7.1 6.4 - 8.2 g/dL    Albumin 4.0 3.5 - 5.0 g/dL    Globulin 3.1 2.0 - 4.0 g/dL    A-G Ratio 1.3 1.1 - 2.2     SAMPLES BEING HELD   Result Value Ref Range    SAMPLES BEING HELD 1red 1blu     COMMENT        Add-on orders for these samples will be processed based on acceptable specimen integrity and analyte stability, which may vary by analyte.    URINALYSIS W/MICROSCOPIC   Result Value Ref Range    Color DARK YELLOW      Appearance CLEAR CLEAR      Specific gravity 1.025 1.003 - 1.030      pH (UA) 7.5 5.0 - 8.0      Protein 30 (A) NEG mg/dL    Glucose Negative NEG mg/dL    Ketone 40 (A) NEG mg/dL    Blood Negative NEG      Urobilinogen 1.0 0.2 - 1.0 EU/dL    Nitrites Negative NEG      Leukocyte Esterase TRACE (A) NEG      WBC 0-4 0 - 4 /hpf    RBC 0-5 0 - 5 /hpf    Epithelial cells MODERATE (A) FEW /lpf    Bacteria 1+ (A) NEG /hpf    Hyaline cast 2-5 0 - 5 /lpf   HCG URINE, QL   Result Value Ref Range    HCG urine, QL Negative NEG     MAGNESIUM   Result Value Ref Range    Magnesium 1.9 1.6 - 2.4 mg/dL   LIPASE   Result Value Ref Range    Lipase 190 73 - 393 U/L   TROPONIN-HIGH SENSITIVITY   Result Value Ref Range    Troponin-High Sensitivity <4 0 - 51 ng/L   BILIRUBIN, CONFIRM   Result Value Ref Range    Bilirubin UA, confirm Negative     EKG, 12 LEAD, INITIAL   Result Value Ref Range    Ventricular Rate 79 BPM    Atrial Rate 79 BPM    P-R Interval 118 ms    QRS Duration 90 ms    Q-T Interval 402 ms    QTC Calculation (Bezet) 460 ms    Calculated P Axis -7 degrees    Calculated R Axis 57 degrees    Calculated T Axis 10 degrees Diagnosis       Normal sinus rhythm  Normal ECG  When compared with ECG of 13-DEC-2020 19:59,  No significant change was found  Confirmed by Mely Figueroa MD, Chantale Medel ([id number]) on 9/1/2022 9:11:03 PM                 An electronic signature was used to authenticate this note.   -- Nolvia Narvaez, NP

## 2023-01-10 ENCOUNTER — OFFICE VISIT (OUTPATIENT)
Dept: INTERNAL MEDICINE CLINIC | Age: 43
End: 2023-01-10
Payer: MEDICAID

## 2023-01-10 VITALS
TEMPERATURE: 98.6 F | HEART RATE: 72 BPM | RESPIRATION RATE: 17 BRPM | SYSTOLIC BLOOD PRESSURE: 122 MMHG | DIASTOLIC BLOOD PRESSURE: 84 MMHG | HEIGHT: 67 IN | OXYGEN SATURATION: 100 % | WEIGHT: 137 LBS | BODY MASS INDEX: 21.5 KG/M2

## 2023-01-10 DIAGNOSIS — Z12.31 ENCOUNTER FOR SCREENING MAMMOGRAM FOR MALIGNANT NEOPLASM OF BREAST: ICD-10-CM

## 2023-01-10 DIAGNOSIS — F31.9 BIPOLAR DEPRESSION (HCC): ICD-10-CM

## 2023-01-10 DIAGNOSIS — F10.11 MILD ALCOHOL USE DISORDER, IN SUSTAINED REMISSION: ICD-10-CM

## 2023-01-10 DIAGNOSIS — Z00.00 ADULT GENERAL MEDICAL EXAMINATION: Primary | ICD-10-CM

## 2023-01-10 DIAGNOSIS — R11.2 INTRACTABLE NAUSEA AND VOMITING: ICD-10-CM

## 2023-01-10 DIAGNOSIS — Z11.4 SCREENING FOR HIV WITHOUT PRESENCE OF RISK FACTORS: ICD-10-CM

## 2023-01-10 DIAGNOSIS — K56.1 INTUSSUSCEPTION INTESTINE (HCC): ICD-10-CM

## 2023-01-10 DIAGNOSIS — Z13.228 SCREENING FOR ENDOCRINE, NUTRITIONAL, METABOLIC AND IMMUNITY DISORDER: ICD-10-CM

## 2023-01-10 DIAGNOSIS — R41.840 DIFFICULTY CONCENTRATING: ICD-10-CM

## 2023-01-10 DIAGNOSIS — F51.01 PRIMARY INSOMNIA: ICD-10-CM

## 2023-01-10 DIAGNOSIS — Z97.5 IUD (INTRAUTERINE DEVICE) IN PLACE: ICD-10-CM

## 2023-01-10 DIAGNOSIS — Z13.21 SCREENING FOR ENDOCRINE, NUTRITIONAL, METABOLIC AND IMMUNITY DISORDER: ICD-10-CM

## 2023-01-10 DIAGNOSIS — Z13.220 SCREENING FOR LIPID DISORDERS: ICD-10-CM

## 2023-01-10 DIAGNOSIS — Z13.29 SCREENING FOR ENDOCRINE, NUTRITIONAL, METABOLIC AND IMMUNITY DISORDER: ICD-10-CM

## 2023-01-10 DIAGNOSIS — Z13.0 SCREENING FOR ENDOCRINE, NUTRITIONAL, METABOLIC AND IMMUNITY DISORDER: ICD-10-CM

## 2023-01-10 DIAGNOSIS — Z11.59 NEED FOR HEPATITIS C SCREENING TEST: ICD-10-CM

## 2023-01-10 PROBLEM — K50.019 CROHN'S DISEASE OF SMALL INTESTINE WITH COMPLICATION (HCC): Status: RESOLVED | Noted: 2021-08-09 | Resolved: 2023-01-10

## 2023-01-10 PROCEDURE — 99396 PREV VISIT EST AGE 40-64: CPT | Performed by: NURSE PRACTITIONER

## 2023-01-10 RX ORDER — DOXEPIN HYDROCHLORIDE 6 MG/1
6 TABLET ORAL
Qty: 90 TABLET | Refills: 3 | Status: SHIPPED | OUTPATIENT
Start: 2023-01-10

## 2023-01-10 RX ORDER — NALTREXONE HYDROCHLORIDE 50 MG/1
TABLET, FILM COATED ORAL
Qty: 90 TABLET | Refills: 3 | Status: SHIPPED | OUTPATIENT
Start: 2023-01-10

## 2023-01-10 NOTE — PROGRESS NOTES
Ankita Weathers is a 38 y.o. female presenting for annual checkup. Specific concerns today: had another episode of N/V, no diarrhea x 2-3 days over Christmas holiday. +dizziness. No other sick contacts. Not able to eat. Tried honey and emergen-C with some relief. Recalls drinking ~1/2 beer only, doesn't feel this time was related to ETOH. Had colonoscopy for diarrhea and nausea, suspected to have crohn's or intussusception. Has not made GYN appt for IUD removal or psych appt for ADD testing. ROS: Feeling well. No dyspnea or chest pain on exertion. No abdominal pain, change in bowel habits, black or bloody stools. Last BM: today, [place]#4. Averages 7 BMs every 7 days. Averages drinking 1-2 bottles of water daily. No urinary tract or gynecologic/prostatic symptoms. No neurological complaints. Review of Systems  Constitutional: negative for fevers, chills, and weight loss  Eyes:   negative for visual disturbance, drainage, and irritation  ENT:   negative for tinnitus,sore throat,nasal congestion,ear pain,and hoarseness  Respiratory:  negative for cough, hemoptysis, dyspnea, and wheezing  CV:   negative for chest pain, palpitations, and lower extremity edema  GI:   H/o intussusception.  negative for diarrhea, abdominal pain, and melena  Endo:               negative for polyuria,polydipsia,polyphagia, and heat intolerance  Genitourinary: negative for frequency, urgency, dysuria, retention, and hematuria  Integument:  negative for rash, ulcerations, and pruritus  Hematologic:  negative for easy bruising and bleeding  Musculoskel: negative for arthralgias, muscle weakness,and joint pain/swelling  Neurological:  negative for headaches,vertigo,and memory/gait problems  Behavl/Psych: +insomnia, resolved with doxepin use. +anxiety and bipolar depression managed well witih wellbutrin and buspar. negative for feelings of suicide and mood changes  МАРИЯ 2/7 9/27/2022   Feeling nervous, anxious, or on edge 1   Not being able to stop or control worrying 1   Worrying too much about different things 2   Trouble relaxing 1   Being so restless that it is hard to sit still 2   Becoming easily annoyed or irritable 1   Feeling afraid as if something awful might happen 1   МАРИЯ-7 Total Score 9     3 most recent PHQ Screens 1/10/2023   Little interest or pleasure in doing things Not at all   Feeling down, depressed, irritable, or hopeless Not at all   Total Score PHQ 2 0   Trouble falling or staying asleep, or sleeping too much Not at all   Feeling tired or having little energy Not at all   Poor appetite, weight loss, or overeating Not at all   Feeling bad about yourself - or that you are a failure or have let yourself or your family down Not at all   Trouble concentrating on things such as school, work, reading, or watching TV Not at all   Moving or speaking so slowly that other people could have noticed; or the opposite being so fidgety that others notice Not at all   Thoughts of being better off dead, or hurting yourself in some way Not at all   PHQ 9 Score 0   How difficult have these problems made it for you to do your work, take care of your home and get along with others Not difficult at all     Discussed ADVANCED DIRECTIVE:yes  Advanced Directive on File: no    Dental exam in past 12 months: no  Eye exam in past 12-24 months: yes    Sexually Active: yes; Safe sex practice by monogamy    Sleep: Averages 6 hours, no snoring, no h/o sleep apnea    Past Medical History:   Diagnosis Date    Anxiety     Depression      Past Surgical History:   Procedure Laterality Date    COLONOSCOPY N/A 8/26/2021    COLONOSCOPY   :- performed by Karlos Mesa MD at [facility] ENDOSCOPY     Social History     Socioeconomic History    Marital status:    Tobacco Use    Smoking status: Some Days     Types: Cigarettes    Smokeless tobacco: Never   Substance and Sexual Activity    Alcohol use: Yes     Comment: 1 drink a day.     Drug use: Not Currently     No family history on file. Current Outpatient Medications   Medication Sig Dispense Refill    Doxepin 6 mg tab Take 6 mg by mouth nightly. May take if awakens in middle of night. Indications: difficulty sleeping 90 Tablet 3    naltrexone (DEPADE) 50 mg tablet TAKE 1 TABLET BY MOUTH EVERY DAY 90 Tablet 3    buPROPion XL (WELLBUTRIN XL) 300 mg XL tablet Take 1 Tablet by mouth every morning. 90 Tablet 1    busPIRone (BUSPAR) 30 mg tablet TAKE 1 TABLET BY MOUTH TWICE A  Tablet 1     Allergies   Allergen Reactions    Compazine [Prochlorperazine] Nausea Only       Objective:  Visit Vitals  /84 (BP 1 Location: Left upper arm, BP Patient Position: Sitting, BP Cuff Size: Large adult)   Pulse 72   Temp 98.6 °F (37 °C) (Temporal)   Resp 17   Ht 5' 7\" (1.702 m)   Wt 137 lb (62.1 kg)   LMP 12/13/2022 (Approximate)   SpO2 100%   BMI 21.46 kg/m²     Wt Readings from Last 3 Encounters:   01/10/23 137 lb (62.1 kg)   09/27/22 136 lb 8 oz (61.9 kg)   08/26/21 135 lb (61.2 kg)     Physical Exam:   General appearance - alert, well appearing, and in no acute distress. Mental status - A/O x 4, mild anxiety with normal affect and restlessness. Head/Eyes- AT/NC. DELVIN, EOMI, corneas normal, no foreign bodies. Ears- TM injected bilaterally, no erythema or drainage. Nose- Septum midline, pink mucosa. Turbinates boggy and pink with mucoid drainage, no polyps or erythema. No sinus tenderness. Mouth/Throat - mucous membranes moist, pharynx normal without lesions. No tonsillar swelling or exudates. Tongue with partially white layer. Neck -Supple ,normal CSP. FROM, non-tender. Mild tonsillar adenopathy. No thyromegaly. No JVD. Chest - CTA. Symmetric chest rise. No wheezing, rales or rhonchi. Heart - Normal rate, regular rhythm. Normal S1, S2. No MGR. Abdomen - Soft,non-distended. Normoactive BS in all quadrants. NT, no mass, rebound, or HSM   Ext- Radial, DP pulses, 2+ bilaterally.  No pedal edema, clubbing, or cyanosis. Skin- Normal for ethnicity, warm, and dry. No hyperpigmentation, ulcerations, or suspicious lesions  Neuro - Normal speech, no focal findings. Normal strength and muscle tone. Mild tremor of eyes. Coordination and gait normal.    CN II-XII intact. Cold and vibratory sensation intact. Normal DTR's. Results for orders placed or performed during the hospital encounter of 09/01/22   URINE CULTURE HOLD SAMPLE    Specimen: Serum; Urine   Result Value Ref Range    Urine culture hold        Urine on hold in Microbiology dept for 2 days. If unpreserved urine is submitted, it cannot be used for addtional testing after 24 hours, recollection will be required. CBC WITH AUTOMATED DIFF   Result Value Ref Range    WBC 4.4 3.6 - 11.0 K/uL    RBC 4.58 3.80 - 5.20 M/uL    HGB 12.9 11.5 - 16.0 g/dL    HCT 39.3 35.0 - 47.0 %    MCV 85.8 80.0 - 99.0 FL    MCH 28.2 26.0 - 34.0 PG    MCHC 32.8 30.0 - 36.5 g/dL    RDW 15.5 (H) 11.5 - 14.5 %    PLATELET 071 378 - 646 K/uL    MPV 9.0 8.9 - 12.9 FL    NRBC 0.0 0  WBC    ABSOLUTE NRBC 0.00 0.00 - 0.01 K/uL    NEUTROPHILS 72 32 - 75 %    LYMPHOCYTES 18 12 - 49 %    MONOCYTES 8 5 - 13 %    EOSINOPHILS 1 0 - 7 %    BASOPHILS 1 0 - 1 %    IMMATURE GRANULOCYTES 0 0.0 - 0.5 %    ABS. NEUTROPHILS 3.2 1.8 - 8.0 K/UL    ABS. LYMPHOCYTES 0.8 0.8 - 3.5 K/UL    ABS. MONOCYTES 0.4 0.0 - 1.0 K/UL    ABS. EOSINOPHILS 0.0 0.0 - 0.4 K/UL    ABS. BASOPHILS 0.0 0.0 - 0.1 K/UL    ABS. IMM.  GRANS. 0.0 0.00 - 0.04 K/UL    DF SMEAR SCANNED      RBC COMMENTS ANISOCYTOSIS  1+        WBC COMMENTS REACTIVE LYMPHS     METABOLIC PANEL, COMPREHENSIVE   Result Value Ref Range    Sodium 136 136 - 145 mmol/L    Potassium 3.3 (L) 3.5 - 5.1 mmol/L    Chloride 101 97 - 108 mmol/L    CO2 26 21 - 32 mmol/L    Anion gap 9 5 - 15 mmol/L    Glucose 94 65 - 100 mg/dL    BUN 10 6 - 20 MG/DL    Creatinine 0.66 0.55 - 1.02 MG/DL    BUN/Creatinine ratio 15 12 - 20      GFR est AA >60 >60 ml/min/1.73m2 GFR est non-AA >60 >60 ml/min/1.73m2    Calcium 9.5 8.5 - 10.1 MG/DL    Bilirubin, total 0.9 0.2 - 1.0 MG/DL    ALT (SGPT) 83 (H) 12 - 78 U/L    AST (SGOT) 86 (H) 15 - 37 U/L    Alk. phosphatase 38 (L) 45 - 117 U/L    Protein, total 7.1 6.4 - 8.2 g/dL    Albumin 4.0 3.5 - 5.0 g/dL    Globulin 3.1 2.0 - 4.0 g/dL    A-G Ratio 1.3 1.1 - 2.2     SAMPLES BEING HELD   Result Value Ref Range    SAMPLES BEING HELD 1red 1blu     COMMENT        Add-on orders for these samples will be processed based on acceptable specimen integrity and analyte stability, which may vary by analyte.    URINALYSIS W/MICROSCOPIC   Result Value Ref Range    Color DARK YELLOW      Appearance CLEAR CLEAR      Specific gravity 1.025 1.003 - 1.030      pH (UA) 7.5 5.0 - 8.0      Protein 30 (A) NEG mg/dL    Glucose Negative NEG mg/dL    Ketone 40 (A) NEG mg/dL    Blood Negative NEG      Urobilinogen 1.0 0.2 - 1.0 EU/dL    Nitrites Negative NEG      Leukocyte Esterase TRACE (A) NEG      WBC 0-4 0 - 4 /hpf    RBC 0-5 0 - 5 /hpf    Epithelial cells MODERATE (A) FEW /lpf    Bacteria 1+ (A) NEG /hpf    Hyaline cast 2-5 0 - 5 /lpf   HCG URINE, QL   Result Value Ref Range    HCG urine, QL Negative NEG     MAGNESIUM   Result Value Ref Range    Magnesium 1.9 1.6 - 2.4 mg/dL   LIPASE   Result Value Ref Range    Lipase 190 73 - 393 U/L   TROPONIN-HIGH SENSITIVITY   Result Value Ref Range    Troponin-High Sensitivity <4 0 - 51 ng/L   BILIRUBIN, CONFIRM   Result Value Ref Range    Bilirubin UA, confirm Negative     EKG, 12 LEAD, INITIAL   Result Value Ref Range    Ventricular Rate 79 BPM    Atrial Rate 79 BPM    P-R Interval 118 ms    QRS Duration 90 ms    Q-T Interval 402 ms    QTC Calculation (Bezet) 460 ms    Calculated P Axis -7 degrees    Calculated R Axis 57 degrees    Calculated T Axis 10 degrees    Diagnosis       Normal sinus rhythm  Normal ECG  When compared with ECG of 13-DEC-2020 19:59,  No significant change was found  Confirmed by Jeremi Leal MD, Stephen Murrieta ([id number]) on 9/1/2022 9:11:03 PM           Assessment/Plan:  Labs ordered. Referred to GI if N/V recurs since care established in 2021 with normal colonoscopy. Given info for GYN and PSYCH again today. Counseling/Anticipatory guidance reviewed with patient: yes  Medication Side Effects and Warnings were discussed with patient: yes   Patient Labs were reviewed: yes  Patient Past Records were reviewed: yes  See orders below  Follow-up and Dispositions    Return in about 2 months (around 3/10/2023) for VV- ADD/GYN/GI fu, lab review. ICD-10-CM ICD-9-CM    1. Adult general medical examination  Z00.00 V70.9 CBC W/O DIFF      METABOLIC PANEL, COMPREHENSIVE      HIV 1/2 AG/AB, 4TH GENERATION,W RFLX CONFIRM      TSH 3RD GENERATION      HCV RNA BY PCR W/REFL GENOTYPE      LIPID PANEL      2. Intractable nausea and vomiting  R11.2 536.2 REFERRAL TO GASTROENTEROLOGY      3. Mild alcohol use disorder, in sustained remission  F10.11 305.03 naltrexone (DEPADE) 50 mg tablet      4. Screening for HIV without presence of risk factors  Z11.4 V73.89 HIV 1/2 AG/AB, 4TH GENERATION,W RFLX CONFIRM      5. Need for hepatitis C screening test  Z11.59 V73.89 HCV RNA BY PCR W/REFL GENOTYPE      6. Screening for endocrine, nutritional, metabolic and immunity disorder  Z13.29 V77.99 CBC W/O DIFF    L05.11  METABOLIC PANEL, COMPREHENSIVE    Z13.228  TSH 3RD GENERATION    Z13.0        7. Screening for lipid disorders  Z13.220 V77.91 LIPID PANEL      8. Encounter for screening mammogram for malignant neoplasm of breast  Z12.31 V76.12 [facility] MAMMO BI SCREENING INCL CAD      9. Difficulty concentrating  R41.840 799.51       10. IUD (intrauterine device) in place  Z97.5 V45.51       11. Bipolar depression ([address]  F31.9 296.50       12. Intussusception intestine (HCC)  K56.1 560.0       13.  Primary insomnia  F51.01 307.42         Orders Placed This Encounter    [facility] MAMMO BI SCREENING INCL CAD     Standing Status:   Future     Standing Expiration Date:   2/11/2024     Order Specific Question:   Is Patient Pregnant? Answer:   No     Order Specific Question:   Reason for Exam     Answer:   breast cancer screening    CBC W/O DIFF    METABOLIC PANEL, COMPREHENSIVE    HIV 1/2 AG/AB, 4TH GENERATION,W RFLX CONFIRM    TSH 3RD GENERATION    HCV RNA BY PCR W/REFL GENOTYPE    LIPID PANEL    Remigio Rojas [facility] OF THE [facility]     Referral Priority:   Routine     Referral Type:   Consultation     Referral Reason:   Specialty Services Required     Referred to Provider:   Francetta Phalen., MD     Number of Visits Requested:   1    Doxepin 6 mg tab     Sig: Take 6 mg by mouth nightly. May take if awakens in middle of night. Indications: difficulty sleeping     Dispense:  90 Tablet     Refill:  3    naltrexone (DEPADE) 50 mg tablet     Sig: TAKE 1 TABLET BY MOUTH EVERY DAY     Dispense:  90 Tablet     Refill:  3         Tiki Bustso expressed understanding of plan. An After Visit Summary was offered/printed and given to the patient.

## 2023-01-10 NOTE — ACP (ADVANCE CARE PLANNING)
Advanced care planning- discussed Advance directive, Medical POA, and life sustaining options. Advised of free virtual or in-person visit for advance care planning paperwork completion with ACP specialist. Referreal sent. Advance Care Planning (ACP) Provider Conversation Snapshot    Date of ACP Conversation: 01/10/23  Persons included in Conversation:  Patient/family  Length of ACP Conversation in minutes:  5-10 minutes    Authorized Decision Maker (if patient is incapable of making informed decisions): This person is:   Healthcare Agent/Medical Power of  under Advance Directive        For Patients with Decision Making Capacity:   Intubation, CPR, use of IVF/Nutrition, Tube Feedings, and organ donation options reviewed briefly    Conversation Outcomes / Follow-Up Plan:   Recommended completion of Advance Directive form after review of ACP materials and conversation with prospective healthcare agent     Referral made for ACP follow-up assistance to:  ACP facilitator/specialist    ====Advance Care Planning Invitation====    Patient was invited to begin or continue Advance Care Planning on this date and reviewed ACP materials in the office OR discussed in detail during virtual visit. Recommended appointment with a [facility]  facilitator for ACP conversation regarding advance directives. [x] Yes  [] No  Referral sent to [facility] ACP team member or Coordinator for follow-up    [] Yes  [x] No  Patient scheduled an appointment.        Site of Referral: [place] [id number]

## 2023-01-10 NOTE — PROGRESS NOTES
Pt Is here for   Chief Complaint   Patient presents with    Follow-up     GYN/Psych    Physical     With Labs      1. Have you been to the ER, urgent care clinic since your last visit? Hospitalized since your last visit? No    2. Have you seen or consulted any other health care providers outside of the [address] [place] [address] since your last visit? Include any pap smears or colon screening.  No    Denies pain at this time

## 2023-01-10 NOTE — PATIENT INSTRUCTIONS
Call GYN for IUD removal and re-insertion [phone]    Please call Jessie Garcia for ADD testing for difficulty concentrating and forgetfulness.    Phone: [phone]

## 2023-01-13 PROBLEM — E78.2 MODERATE MIXED HYPERLIPIDEMIA NOT REQUIRING STATIN THERAPY: Status: ACTIVE | Noted: 2023-01-13

## 2023-01-13 LAB
ALBUMIN SERPL-MCNC: 4.8 G/DL (ref 3.8–4.8)
ALBUMIN/GLOB SERPL: 2 {RATIO} (ref 1.2–2.2)
ALP SERPL-CCNC: 44 IU/L (ref 44–121)
ALT SERPL-CCNC: 32 IU/L (ref 0–32)
AST SERPL-CCNC: 51 IU/L (ref 0–40)
BILIRUB SERPL-MCNC: 0.5 MG/DL (ref 0–1.2)
BUN SERPL-MCNC: 8 MG/DL (ref 6–24)
BUN/CREAT SERPL: 15 (ref 9–23)
CALCIUM SERPL-MCNC: 9.5 MG/DL (ref 8.7–10.2)
CHLORIDE SERPL-SCNC: 101 MMOL/L (ref 96–106)
CHOLEST SERPL-MCNC: 242 MG/DL (ref 100–199)
CO2 SERPL-SCNC: 20 MMOL/L (ref 20–29)
CREAT SERPL-MCNC: 0.54 MG/DL (ref 0.57–1)
EGFR: 119 ML/MIN/1.73
ERYTHROCYTE [DISTWIDTH] IN BLOOD BY AUTOMATED COUNT: 14.8 % (ref 11.7–15.4)
GLOBULIN SER CALC-MCNC: 2.4 G/DL (ref 1.5–4.5)
GLUCOSE SERPL-MCNC: 111 MG/DL (ref 70–99)
HCT VFR BLD AUTO: 36.4 % (ref 34–46.6)
HCV GENTYP SERPL NAA+PROBE: NORMAL
HCV RNA SERPL NAA+PROBE-ACNC: NORMAL IU/ML
HCV RNA SERPL NAA+PROBE-LOG IU: NORMAL LOG10 IU/ML
HDLC SERPL-MCNC: 131 MG/DL
HGB BLD-MCNC: 11.8 G/DL (ref 11.1–15.9)
HIV 1+2 AB+HIV1 P24 AG SERPL QL IA: NON REACTIVE
IMP & REVIEW OF LAB RESULTS: NORMAL
LDLC SERPL CALC-MCNC: 101 MG/DL (ref 0–99)
MCH RBC QN AUTO: 26.9 PG (ref 26.6–33)
MCHC RBC AUTO-ENTMCNC: 32.4 G/DL (ref 31.5–35.7)
MCV RBC AUTO: 83 FL (ref 79–97)
PLATELET # BLD AUTO: 348 X10E3/UL (ref 150–450)
POTASSIUM SERPL-SCNC: 4.1 MMOL/L (ref 3.5–5.2)
PROT SERPL-MCNC: 7.2 G/DL (ref 6–8.5)
RBC # BLD AUTO: 4.39 X10E6/UL (ref 3.77–5.28)
SODIUM SERPL-SCNC: 143 MMOL/L (ref 134–144)
TEST INFORMATION: NORMAL
TRIGL SERPL-MCNC: 60 MG/DL (ref 0–149)
TSH SERPL DL<=0.005 MIU/L-ACNC: 0.93 UIU/ML (ref 0.45–4.5)
VLDLC SERPL CALC-MCNC: 10 MG/DL (ref 5–40)
WBC # BLD AUTO: 4.7 X10E3/UL (ref 3.4–10.8)

## 2023-03-13 ENCOUNTER — VIRTUAL VISIT (OUTPATIENT)
Dept: INTERNAL MEDICINE CLINIC | Age: 43
End: 2023-03-13
Payer: MEDICAID

## 2023-03-13 DIAGNOSIS — Z97.5 IUD (INTRAUTERINE DEVICE) IN PLACE: ICD-10-CM

## 2023-03-13 DIAGNOSIS — E86.0 DEHYDRATION: ICD-10-CM

## 2023-03-13 DIAGNOSIS — R29.90 NEUROLOGICAL COMPLAINT: ICD-10-CM

## 2023-03-13 DIAGNOSIS — R41.840 DIFFICULTY CONCENTRATING: Primary | ICD-10-CM

## 2023-03-13 DIAGNOSIS — R11.2 INTRACTABLE NAUSEA AND VOMITING: ICD-10-CM

## 2023-03-13 PROCEDURE — 99213 OFFICE O/P EST LOW 20 MIN: CPT | Performed by: NURSE PRACTITIONER

## 2023-03-13 NOTE — PROGRESS NOTES
Chief Complaint   Patient presents with    Follow-up     ADD/GYN/GI- was not able to schedule any referral visits, lab review       1. \"Have you been to the ER, urgent care clinic since your last visit? Hospitalized since your last visit? \" Yes When: 2/20/23 [facility]  Reason for visit: dehydration    2. \"Have you seen or consulted any other health care providers outside of the [address] [place] [address] since your last visit? \" No     3. For patients aged 39-70: Has the patient had a colonoscopy / FIT/ Cologuard? NA - based on age      If the patient is female:    4. For patients aged 41-77: Has the patient had a mammogram within the past 2 years? NA - based on age or sex      11. For patients aged 21-65: Has the patient had a pap smear?  No

## 2023-03-13 NOTE — PROGRESS NOTES
Anamika Horner is a 38 y.o. female established patient, here for evaluation of the following chief complaint(s):   Follow-up (ADD/GYN/GI- was not able to schedule any referral visits, lab review)          Assessment & Plan:   Diagnoses and all orders for this visit:    1. Difficulty concentrating    2. Neurological complaint  -     REFERRAL TO NEUROLOGY    3. IUD (intrauterine device) in place    4. Dehydration    5. Intractable nausea and vomiting        On this date 03/13/2023 I have spent 20 minutes reviewing previous notes, test results and face to face with the patient discussing the diagnosis and importance of compliance with the treatment plan as well as documenting on the day of the visit. Follow-up and Dispositions    Return in about 10 months (around 1/11/2024) for Physical with labs (pt to call for appt for ADD med start). Specific pt instructions until next visit: call if any problems, offered to managed ADD meds if found to have condition, pt to call for an appt. Referred to neuro for episode reported, suspecting seizure like activity. Advised to call for appts after disconnecting from call today. Subjective:   Anamika Horner is a 38 y.o. female who was seen for Follow-up (ADD/GYN/GI- was not able to schedule any referral visits, lab review)      Pt presents to f/u scheduling appts with GI, GYN, and ADD testing. However, she has not been able to schedule any appts since last visit, but [facility] and [facility] ADD testing. Has already reviewed labs online/letter, has no questions regarding the results or recommendations. Did not plan to see GI however. Seen in UC after episode of severe hand cramping and labored breathing, told she was dehydrated but not to the level of having cramping.        Patient Active Problem List    Diagnosis Date Noted    Moderate mixed hyperlipidemia not requiring statin therapy 01/13/2023    Bipolar depression ([facility] 09/27/2022    Mild alcohol use disorder, in sustained remission 09/27/2022    IUD (intrauterine device) in place 09/27/2022    Difficulty concentrating 09/27/2022    Intussusception intestine ([facility] 08/09/2021     Current Outpatient Medications   Medication Sig Dispense Refill    Doxepin 6 mg tab Take 6 mg by mouth nightly. May take if awakens in middle of night. Indications: difficulty sleeping 90 Tablet 3    naltrexone (DEPADE) 50 mg tablet TAKE 1 TABLET BY MOUTH EVERY DAY 90 Tablet 3    buPROPion XL (WELLBUTRIN XL) 300 mg XL tablet Take 1 Tablet by mouth every morning. 90 Tablet 1    busPIRone (BUSPAR) 30 mg tablet TAKE 1 TABLET BY MOUTH TWICE A  Tablet 1     Allergies   Allergen Reactions    Compazine [Prochlorperazine] Nausea Only     Past Medical History:   Diagnosis Date    Anxiety     Depression      Past Surgical History:   Procedure Laterality Date    COLONOSCOPY N/A 8/26/2021    COLONOSCOPY   :- performed by Leandro Voss MD at [facility] ENDOSCOPY       Review of Systems   Constitutional: Negative for fever and malaise/fatigue. Eyes: Negative for blurred vision. Respiratory: Negative for cough and shortness of breath. Cardiovascular: Negative for chest pain and leg swelling. Neurological: Negative for dizziness, weakness and headaches. Objective:   Vital Signs: (As obtained by patient/caregiver at home)  There were no vitals taken for this visit. Physical Exam:  General appearance - alert, well  appearing, and in no distress. Mental status - A/O x 4, anxious mood and affect. Eyes- trace periorbital edema, drainage, or irritation noted. Nose- no obvious drainage or swelling. Throat- no obvious swelling, goiter, or notable lymphadenopathy  Chest - Symmetric chest rise. No wheezing or coughing. No distress. Skin- normal skin tone noted. No hyperpigmentation or obvious deformities. No diaphoresis noted. No flushing.   Neuro - Normal speech, but mild quiver in voice when discussing episode. no focal findings or movement disorder. Other pertinent observable physical exam findings:-        We discussed the expected course, resolution and complications of the diagnosis(es) in detail. Medication risks, benefits, costs, interactions, and alternatives were discussed as indicated. I advised her to contact the office if her condition worsens, changes or fails to improve as anticipated. She expressed understanding with the diagnosis(es) and plan. Yancy Aguirre, was evaluated through a synchronous (real-time) audio-video encounter. The patient (or guardian if applicable) is aware that this is a billable service, which includes applicable co-pays. This Virtual Visit was conducted with patient's (and/or legal guardian's) consent. The visit was conducted pursuant to the emergency declaration under the [address] [place] [address], [address] [place] [address] waiver authority and the [employer] General Act. Patient identification was verified, and a caregiver was present when appropriate. The patient was located at: Home: [address] [place] [address]  The provider was located at: Home: [place]      An [address] [facility] was used to authenticate this note.   -- Raúl Eubanks NP

## 2023-04-24 ENCOUNTER — APPOINTMENT (OUTPATIENT)
Dept: CT IMAGING | Age: 43
End: 2023-04-24
Attending: STUDENT IN AN ORGANIZED HEALTH CARE EDUCATION/TRAINING PROGRAM
Payer: MEDICAID

## 2023-04-24 ENCOUNTER — APPOINTMENT (OUTPATIENT)
Dept: CT IMAGING | Age: 43
End: 2023-04-24
Attending: EMERGENCY MEDICINE
Payer: MEDICAID

## 2023-04-24 ENCOUNTER — HOSPITAL ENCOUNTER (EMERGENCY)
Age: 43
Discharge: HOME | End: 2023-04-24
Attending: STUDENT IN AN ORGANIZED HEALTH CARE EDUCATION/TRAINING PROGRAM
Payer: MEDICAID

## 2023-04-24 VITALS
SYSTOLIC BLOOD PRESSURE: 140 MMHG | BODY MASS INDEX: 21.05 KG/M2 | HEART RATE: 101 BPM | TEMPERATURE: 98.1 F | RESPIRATION RATE: 18 BRPM | OXYGEN SATURATION: 98 % | HEIGHT: 68 IN | WEIGHT: 138.89 LBS | DIASTOLIC BLOOD PRESSURE: 89 MMHG

## 2023-04-24 DIAGNOSIS — R56.9 NEW ONSET SEIZURE (HCC): Primary | ICD-10-CM

## 2023-04-24 DIAGNOSIS — Z87.898 HISTORY OF ALCOHOL USE DISORDER: ICD-10-CM

## 2023-04-24 LAB
ALBUMIN SERPL-MCNC: 3.9 G/DL (ref 3.5–5)
ALBUMIN/GLOB SERPL: 0.9 (ref 1.1–2.2)
ALP SERPL-CCNC: 60 U/L (ref 45–117)
ALT SERPL-CCNC: 69 U/L (ref 12–78)
AMPHET UR QL SCN: POSITIVE
ANION GAP SERPL CALC-SCNC: 14 MMOL/L (ref 5–15)
AST SERPL-CCNC: 84 U/L (ref 15–37)
ATRIAL RATE: 107 BPM
BARBITURATES UR QL SCN: NEGATIVE
BASOPHILS # BLD: 0.1 K/UL (ref 0–0.1)
BASOPHILS NFR BLD: 1 % (ref 0–1)
BENZODIAZ UR QL: NEGATIVE
BILIRUB SERPL-MCNC: 0.5 MG/DL (ref 0.2–1)
BUN SERPL-MCNC: 9 MG/DL (ref 6–20)
BUN/CREAT SERPL: 12 (ref 12–20)
CALCIUM SERPL-MCNC: 9.6 MG/DL (ref 8.5–10.1)
CALCULATED P AXIS, ECG09: 1 DEGREES
CALCULATED R AXIS, ECG10: 11 DEGREES
CALCULATED T AXIS, ECG11: 21 DEGREES
CANNABINOIDS UR QL SCN: NEGATIVE
CHLORIDE SERPL-SCNC: 99 MMOL/L (ref 97–108)
CO2 SERPL-SCNC: 21 MMOL/L (ref 21–32)
COCAINE UR QL SCN: NEGATIVE
COMMENT, HOLDF: NORMAL
CREAT SERPL-MCNC: 0.73 MG/DL (ref 0.55–1.02)
DIAGNOSIS, 93000: NORMAL
DIFFERENTIAL METHOD BLD: ABNORMAL
DRUG SCRN COMMENT,DRGCM: ABNORMAL
EOSINOPHIL # BLD: 0 K/UL (ref 0–0.4)
EOSINOPHIL NFR BLD: 0 % (ref 0–7)
ERYTHROCYTE [DISTWIDTH] IN BLOOD BY AUTOMATED COUNT: 18.1 % (ref 11.5–14.5)
ETHANOL SERPL-MCNC: 20 MG/DL
GLOBULIN SER CALC-MCNC: 4.3 G/DL (ref 2–4)
GLUCOSE SERPL-MCNC: 144 MG/DL (ref 65–100)
HCG UR QL: NEGATIVE
HCT VFR BLD AUTO: 37 % (ref 35–47)
HGB BLD-MCNC: 11.6 G/DL (ref 11.5–16)
IMM GRANULOCYTES # BLD AUTO: 0.1 K/UL (ref 0–0.04)
IMM GRANULOCYTES NFR BLD AUTO: 1 % (ref 0–0.5)
INR PPP: 1 (ref 0.9–1.1)
LACTATE SERPL-SCNC: 8.1 MMOL/L (ref 0.4–2)
LYMPHOCYTES # BLD: 0.6 K/UL (ref 0.8–3.5)
LYMPHOCYTES NFR BLD: 9 % (ref 12–49)
MCH RBC QN AUTO: 25.4 PG (ref 26–34)
MCHC RBC AUTO-ENTMCNC: 31.4 G/DL (ref 30–36.5)
MCV RBC AUTO: 81.1 FL (ref 80–99)
METHADONE UR QL: NEGATIVE
MONOCYTES # BLD: 0.4 K/UL (ref 0–1)
MONOCYTES NFR BLD: 7 % (ref 5–13)
NEUTS SEG # BLD: 5.2 K/UL (ref 1.8–8)
NEUTS SEG NFR BLD: 82 % (ref 32–75)
NRBC # BLD: 0 K/UL (ref 0–0.01)
NRBC BLD-RTO: 0 PER 100 WBC
OPIATES UR QL: NEGATIVE
P-R INTERVAL, ECG05: 194 MS
PCP UR QL: NEGATIVE
PLATELET # BLD AUTO: 322 K/UL (ref 150–400)
PMV BLD AUTO: 8.5 FL (ref 8.9–12.9)
POTASSIUM SERPL-SCNC: 3.2 MMOL/L (ref 3.5–5.1)
PROT SERPL-MCNC: 8.2 G/DL (ref 6.4–8.2)
PROTHROMBIN TIME: 10.7 SEC (ref 9–11.1)
Q-T INTERVAL, ECG07: 354 MS
QRS DURATION, ECG06: 88 MS
QTC CALCULATION (BEZET), ECG08: 472 MS
RBC # BLD AUTO: 4.56 M/UL (ref 3.8–5.2)
RBC MORPH BLD: ABNORMAL
SAMPLES BEING HELD,HOLD: NORMAL
SODIUM SERPL-SCNC: 134 MMOL/L (ref 136–145)
VENTRICULAR RATE, ECG03: 107 BPM
WBC # BLD AUTO: 6.4 K/UL (ref 3.6–11)

## 2023-04-24 PROCEDURE — 36415 COLL VENOUS BLD VENIPUNCTURE: CPT

## 2023-04-24 PROCEDURE — 93005 ELECTROCARDIOGRAM TRACING: CPT

## 2023-04-24 PROCEDURE — 74011250637 HC RX REV CODE- 250/637: Performed by: STUDENT IN AN ORGANIZED HEALTH CARE EDUCATION/TRAINING PROGRAM

## 2023-04-24 PROCEDURE — 85610 PROTHROMBIN TIME: CPT

## 2023-04-24 PROCEDURE — 96361 HYDRATE IV INFUSION ADD-ON: CPT

## 2023-04-24 PROCEDURE — 70450 CT HEAD/BRAIN W/O DYE: CPT

## 2023-04-24 PROCEDURE — 85025 COMPLETE CBC W/AUTO DIFF WBC: CPT

## 2023-04-24 PROCEDURE — 82077 ASSAY SPEC XCP UR&BREATH IA: CPT

## 2023-04-24 PROCEDURE — 96360 HYDRATION IV INFUSION INIT: CPT

## 2023-04-24 PROCEDURE — 80307 DRUG TEST PRSMV CHEM ANLYZR: CPT

## 2023-04-24 PROCEDURE — 80053 COMPREHEN METABOLIC PANEL: CPT

## 2023-04-24 PROCEDURE — 83605 ASSAY OF LACTIC ACID: CPT

## 2023-04-24 PROCEDURE — 74011250636 HC RX REV CODE- 250/636: Performed by: STUDENT IN AN ORGANIZED HEALTH CARE EDUCATION/TRAINING PROGRAM

## 2023-04-24 PROCEDURE — 81025 URINE PREGNANCY TEST: CPT

## 2023-04-24 PROCEDURE — 99284 EMERGENCY DEPT VISIT MOD MDM: CPT

## 2023-04-24 RX ORDER — POTASSIUM CHLORIDE 750 MG/1
20 TABLET, FILM COATED, EXTENDED RELEASE ORAL
Status: COMPLETED | OUTPATIENT
Start: 2023-04-24 | End: 2023-04-24

## 2023-04-24 RX ADMIN — POTASSIUM CHLORIDE 20 MEQ: 750 TABLET, FILM COATED, EXTENDED RELEASE ORAL at 18:49

## 2023-04-24 RX ADMIN — SODIUM CHLORIDE 1000 ML: 9 INJECTION, SOLUTION INTRAVENOUS at 17:57

## 2023-04-24 NOTE — ED NOTES
TRIAGE: Pt arrives from work after she had an episode of aphasia, shaking, and seizure like activity that lasted approximately 1-2 min. Emelina Sheryl was 1545. Denies blood thinners or hitting head. . +lightheadedness and nausea.

## 2023-04-24 NOTE — ED PROVIDER NOTES
EMERGENCY DEPARTMENT HISTORY AND PHYSICAL EXAM      Date: 4/24/2023  Patient Name: Shonda Bashir    History of Presenting Illness     HPI: Shonda Bashir, 38 y.o. female with pmhx of EtOH abuse, anxiety, depression, presents to the ED with EMS initially as a prehospital level 1 stroke alert. Patient was leaving work when she was noted by a friend to have some difficulty with getting her words out, this was then followed by witnessed shaking/seizure like activity that lasted 1 to 2 minutes. Patient reports she remembers feeling dizzy prior to event. However, cannot recall what happened after. She denies any tongue biting or urinary incontinence. She denies prior history of seizure disorder. EMS states last known well was around [phone]. On EMS arrival, they report patient was presenting in a manner consistent with postictal state. Her level of alertness progressively improved over time. On ER arrival, patient currently states she is feeling much better, though still not feeling 100% back to her normal self. She reports feeling dizzy and lightheaded at this time, with some mild nausea. She denies any headaches. Denies any abnormal speech at this time. No vision changes, facial droop, weakness, numbness, incoordination. PCP: Luisa Jenkins NP    No current facility-administered medications on file prior to encounter. Current Outpatient Medications on File Prior to Encounter   Medication Sig Dispense Refill    Doxepin 6 mg tab Take 6 mg by mouth nightly. May take if awakens in middle of night. Indications: difficulty sleeping 90 Tablet 3    naltrexone (DEPADE) 50 mg tablet TAKE 1 TABLET BY MOUTH EVERY DAY 90 Tablet 3    buPROPion XL (WELLBUTRIN XL) 300 mg XL tablet Take 1 Tablet by mouth every morning.  90 Tablet 1    busPIRone (BUSPAR) 30 mg tablet TAKE 1 TABLET BY MOUTH TWICE A  Tablet 1       Past History     Past Medical History:  Past Medical History:   Diagnosis Date    Anxiety Depression        Past Surgical History:  Past Surgical History:   Procedure Laterality Date    COLONOSCOPY N/A 8/26/2021    COLONOSCOPY   :- performed by Holli Villegas MD at [facility] ENDOSCOPY       Family History:  History reviewed. No pertinent family history. Social History:  Social History     Tobacco Use    Smoking status: Some Days     Types: Cigarettes    Smokeless tobacco: Never   Substance Use Topics    Alcohol use: Yes     Comment: 5 beers/wine    Drug use: Not Currently       Allergies: Allergies   Allergen Reactions    Compazine [Prochlorperazine] Nausea Only         Review of Systems   Review of Systems   All other systems reviewed and are negative. Physical Exam   Physical Exam  Vitals and nursing note reviewed. Constitutional:       General: She is not in acute distress. Appearance: She is not ill-appearing or toxic-appearing. HENT:      Head: Normocephalic and atraumatic. Nose: Nose normal.      Mouth/Throat:      Mouth: Mucous membranes are moist.   Eyes:      Extraocular Movements: Extraocular movements intact. Pupils: Pupils are equal, round, and reactive to light. Cardiovascular:      Rate and Rhythm: Regular rhythm. Tachycardia present. Pulses: Normal pulses. Pulmonary:      Effort: Pulmonary effort is normal.      Breath sounds: No stridor. No wheezing or rhonchi. Abdominal:      General: Abdomen is flat. There is no distension. Tenderness: There is no abdominal tenderness. Musculoskeletal:         General: Normal range of motion. Cervical back: Normal range of motion and neck supple. Skin:     General: Skin is warm and dry. Neurological:      General: No focal deficit present. Mental Status: She is alert and oriented to person, place, and time. GCS: GCS eye subscore is 4. GCS verbal subscore is 5. GCS motor subscore is 6. Cranial Nerves: Cranial nerves 2-12 are intact. Sensory: Sensation is intact.       Motor: Motor function is intact. Coordination: Coordination is intact. Gait: Gait is intact. Comments: NIH stroke scale score of 0   Psychiatric:         Mood and Affect: Mood is anxious. Judgment: Judgment normal.       Diagnostic Study Results     Labs -     Recent Results (from the past 24 hour(s))   CBC WITH AUTOMATED DIFF    Collection Time: 04/24/23  4:55 PM   Result Value Ref Range    WBC 6.4 3.6 - 11.0 K/uL    RBC 4.56 3.80 - 5.20 M/uL    HGB 11.6 11.5 - 16.0 g/dL    HCT 37.0 35.0 - 47.0 %    MCV 81.1 80.0 - 99.0 FL    MCH 25.4 (L) 26.0 - 34.0 PG    MCHC 31.4 30.0 - 36.5 g/dL    RDW 18.1 (H) 11.5 - 14.5 %    PLATELET 830 747 - 902 K/uL    MPV 8.5 (L) 8.9 - 12.9 FL    NRBC 0.0 0  WBC    ABSOLUTE NRBC 0.00 0.00 - 0.01 K/uL    NEUTROPHILS 82 (H) 32 - 75 %    LYMPHOCYTES 9 (L) 12 - 49 %    MONOCYTES 7 5 - 13 %    EOSINOPHILS 0 0 - 7 %    BASOPHILS 1 0 - 1 %    IMMATURE GRANULOCYTES 1 (H) 0.0 - 0.5 %    ABS. NEUTROPHILS 5.2 1.8 - 8.0 K/UL    ABS. LYMPHOCYTES 0.6 (L) 0.8 - 3.5 K/UL    ABS. MONOCYTES 0.4 0.0 - 1.0 K/UL    ABS. EOSINOPHILS 0.0 0.0 - 0.4 K/UL    ABS. BASOPHILS 0.1 0.0 - 0.1 K/UL    ABS. IMM. GRANS. 0.1 (H) 0.00 - 0.04 K/UL    DF SMEAR SCANNED      RBC COMMENTS ANISOCYTOSIS  1+       METABOLIC PANEL, COMPREHENSIVE    Collection Time: 04/24/23  4:55 PM   Result Value Ref Range    Sodium 134 (L) 136 - 145 mmol/L    Potassium 3.2 (L) 3.5 - 5.1 mmol/L    Chloride 99 97 - 108 mmol/L    CO2 21 21 - 32 mmol/L    Anion gap 14 5 - 15 mmol/L    Glucose 144 (H) 65 - 100 mg/dL    BUN 9 6 - 20 MG/DL    Creatinine 0.73 0.55 - 1.02 MG/DL    BUN/Creatinine ratio 12 12 - 20      eGFR >60 >60 ml/min/1.73m2    Calcium 9.6 8.5 - 10.1 MG/DL    Bilirubin, total 0.5 0.2 - 1.0 MG/DL    ALT (SGPT) 69 12 - 78 U/L    AST (SGOT) 84 (H) 15 - 37 U/L    Alk.  phosphatase 60 45 - 117 U/L    Protein, total 8.2 6.4 - 8.2 g/dL    Albumin 3.9 3.5 - 5.0 g/dL    Globulin 4.3 (H) 2.0 - 4.0 g/dL    A-G Ratio 0.9 (L) 1.1 - 2. 2     PROTHROMBIN TIME + INR    Collection Time: 04/24/23  4:55 PM   Result Value Ref Range    INR 1.0 0.9 - 1.1      Prothrombin time 10.7 9.0 - 11.1 sec   LACTIC ACID    Collection Time: 04/24/23  4:55 PM   Result Value Ref Range    Lactic acid 8.1 (HH) 0.4 - 2.0 MMOL/L   SAMPLES BEING HELD    Collection Time: 04/24/23  4:55 PM   Result Value Ref Range    SAMPLES BEING HELD 1RED 1UA 1UC     COMMENT        Add-on orders for these samples will be processed based on acceptable specimen integrity and analyte stability, which may vary by analyte. DRUG SCREEN, URINE    Collection Time: 04/24/23  4:55 PM   Result Value Ref Range    AMPHETAMINES Positive (A) NEG      BARBITURATES Negative NEG      BENZODIAZEPINES Negative NEG      COCAINE Negative NEG      METHADONE Negative NEG      OPIATES Negative NEG      PCP(PHENCYCLIDINE) Negative NEG      THC (TH-CANNABINOL) Negative NEG      Drug screen comment (NOTE)    ETHYL ALCOHOL    Collection Time: 04/24/23  4:55 PM   Result Value Ref Range    ALCOHOL(ETHYL),SERUM 20 (H) <10 MG/DL   EKG, 12 LEAD, INITIAL    Collection Time: 04/24/23  4:58 PM   Result Value Ref Range    Ventricular Rate 107 BPM    Atrial Rate 107 BPM    P-R Interval 194 ms    QRS Duration 88 ms    Q-T Interval 354 ms    QTC Calculation (Bezet) 472 ms    Calculated P Axis 1 degrees    Calculated R Axis 11 degrees    Calculated T Axis 21 degrees    Diagnosis       Sinus tachycardia  Minimal voltage criteria for LVH, may be normal variant ( Rojas product )  Borderline ECG  When compared with ECG of 01-SEP-2022 05:47,  No significant change was found  Confirmed by Janessa Simmons MD ([id number]) on 4/24/2023 8:27:03 PM     HCG URINE, QL. - POC    Collection Time: 04/24/23  5:59 PM   Result Value Ref Range    Pregnancy test,urine (POC) Negative NEG         Radiologic Studies -   CT HEAD WO CONT   Final Result   No acute intracranial process. There is no acute fracture or dislocation identified.          CT Results  (Last 48 hours)                 04/24/23 1721  CT HEAD WO CONT Final result    Impression:  No acute intracranial process. There is no acute fracture or dislocation identified. Narrative:  CLINICAL HISTORY: transient aphasia, possible seizure vs convulsive syncope   INDICATION: transient aphasia, possible seizure vs convulsive syncope   COMPARISON: None. CT dose reduction was achieved through use of a standardized protocol tailored   for this examination and automatic exposure control for dose modulation. TECHNIQUE: Serial axial images with a collimation of 5 mm were obtained from the   skull base through the vertex     FINDINGS:    The sulci and ventricles are within normal limits for patient age. There is no   evidence of an acute infarction, hemorrhage, or mass-effect. There is no   evidence of midline shift or hydrocephalus. Posterior fossa structures are   unremarkable. No extra-axial collections are seen. Mastoid air cells are well pneumatized and clear. There is no evidence of depressed skull fractures of soft tissue swelling. CXR Results  (Last 48 hours)      None            Medical Decision Making   IBertin MD-- am the first provider for this patient, and I am the attending of record for this patient encounter. I reviewed the vital signs, available nursing notes, past medical history, past surgical history, family history and social history. Vital Signs-Reviewed the patient's vital signs.   Patient Vitals for the past 24 hrs:   Temp Pulse Resp BP SpO2   04/24/23 1944 98.1 °F (36.7 °C) (!) 101 18 (!) 140/89 98 %   04/24/23 1850 -- (!) 105 20 (!) 145/98 96 %   04/24/23 1835 -- (!) 111 23 -- 100 %   04/24/23 1820 -- 99 22 -- 99 %   04/24/23 1805 -- (!) 102 21 (!) 149/106 100 %   04/24/23 1730 -- (!) 107 14 -- 99 %   04/24/23 1715 -- -- -- (!) 148/96 99 %   04/24/23 1654 99 °F (37.2 °C) (!) 107 21 (!) 154/107 98 %       Records Reviewed: Prior medical records and Nursing notes    Provider Notes (Medical Decision Making):   46-year-old female with past medical history of EtOH abuse, depression, anxiety, presenting via EMS initially as a prehospital level 1 stroke alert. Patient observed by friend to have had an episode of loss of consciousness associated with seizure-like activity, followed by postictal state described by EMS. On my exam, patient is completely neuro intact at this time, with NIH stroke scale score of 0 at this time. Patient has 0 cardiovascular/stroke risk factors. Doubt TIA/CVA. Suspect episode possibly secondary to new onset seizure/alcohol withdrawal seizure, versus convulsive syncope. Stroke alert canceled after my evaluation as patient currently without neurodeficits. We will check EKG, CBC, CMP, lactic acid, EtOH, urine pregnancy, urine drug screen, CT head. We will hydrate patient with 1 L of IVF while work-up is in process. ED Course as of 04/25/23 0010   Mon Apr 24, 2023   1746 EKG at 16:58-- Sinus tachycardia, 107 bpm, no acute ST changes concerning for ischemia. Qtc normal. EKG interpretation by Kervin Saha MD   [JM]      ED Course User Index  [JM] Rod Little MD     Labs remarkable for mild hypokalemia of 3.2. Supplemented with 20 mEq of oral KCl. Slightly elevated AST of 84. Lactic acid extremely elevated at 8.1, in line with suspected seizure event. Do not suspect other endorgan acute ischemia, or sepsis. Ethyl alcohol level slightly elevated at 20. Drixoral positive for amphetamines, however, patient does take Wellbutrin. CT head is negative for acute process. Patient remained in stable condition throughout her ER course, without recurrence of seizure activity. She reports feeling better on my repeat evaluation. After lengthy discussion with patient as well as , she admits that she has recently increased her alcohol intake. Her last drink was yesterday, however, reports only 1 drink yesterday.   She denies any other withdrawal symptoms at this time. Denies prior history of EtOH withdrawal including seizures, DTs, or prior history of hospitalization for withdrawal treatment. I did advise the patient as well as  that her seizure episode today is possibly related to EtOH use/withdrawal.  Patient is advised to cut down on alcohol use, and seek professional supervised detox to prevent future recurrence of symptoms. [place] referral provided. As this is patient's first seizure, likely triggered by recent increase in EtOH, and she has since returned to baseline neuro status without recurrence of seizures. She has no other active evidence of severe EtOH withdrawal symptoms necessitating hospitalization. I do not feel she requires inpatient management or aggressive initiation of AEDs for isolated first-time seizure. Discussed with the patient--who also shares that she would much prefer to be discharged and have this followed up in the outpatient setting as well as she would feel more comfortable at home. Neuro referral provided, advised to call for early appointment, ideally within the next week to be seen in office for ongoing management. I reviewed strict return precautions with the patient as well as -including but not limited to: Immediate return to ER should she have recurrence of seizures, or new or worsening neurologic symptoms of concern. They verbalized understanding and agreement. Patient independently ambulatory out of the ER with steady gait. ED Course:   Initial assessment performed. The patient's presenting problems have been discussed, and they are in agreement with the care plan formulated and outlined with them. I have encouraged them to ask questions as they arise throughout their visit. Getachew Stern MD      Disposition:  DC      DISCHARGE PLAN:  1. Discharge Medication List as of 4/24/2023  7:10 PM        2.    Follow-up Information       Follow up With Specialties Details Why [address] [facility]  Schedule an appointment as soon as possible for a visit in 3 days  [address]    [facility] EMERGENCY DEP Emergency Medicine  If symptoms worsen Laurent Garcia 17 [address] [facility]  Schedule an appointment as soon as possible for a visit  For alcohol detox [address], [place] [address] [address] [facility]    [phone]          3. Return to ED if worse     Diagnosis     Clinical Impression:   1. New onset seizure ([address]    2. History of alcohol use disorder        Attestations:    Day Norton MD    Please note that this dictation was completed with [employer], the computer voice recognition software. Quite often unanticipated grammatical, syntax, homophones, and other interpretive errors are inadvertently transcribed by the computer software. Please disregard these errors. Please excuse any errors that have escaped final proofreading. Thank you.

## 2023-04-25 ENCOUNTER — APPOINTMENT (OUTPATIENT)
Dept: GENERAL RADIOLOGY | Age: 43
End: 2023-04-25
Attending: NURSE PRACTITIONER
Payer: MEDICAID

## 2023-04-25 ENCOUNTER — APPOINTMENT (OUTPATIENT)
Dept: MRI IMAGING | Age: 43
End: 2023-04-25
Attending: FAMILY MEDICINE
Payer: MEDICAID

## 2023-04-25 ENCOUNTER — APPOINTMENT (OUTPATIENT)
Dept: CT IMAGING | Age: 43
End: 2023-04-25
Attending: EMERGENCY MEDICINE
Payer: MEDICAID

## 2023-04-25 ENCOUNTER — HOSPITAL ENCOUNTER (INPATIENT)
Age: 43
LOS: 1 days | Discharge: HOME | End: 2023-04-26
Attending: EMERGENCY MEDICINE | Admitting: FAMILY MEDICINE
Payer: MEDICAID

## 2023-04-25 DIAGNOSIS — R56.9 SEIZURE (HCC): Primary | ICD-10-CM

## 2023-04-25 DIAGNOSIS — R13.10 DYSPHAGIA, UNSPECIFIED TYPE: ICD-10-CM

## 2023-04-25 LAB
ALBUMIN SERPL-MCNC: 3.8 G/DL (ref 3.5–5)
ALBUMIN/GLOB SERPL: 0.9 (ref 1.1–2.2)
ALP SERPL-CCNC: 57 U/L (ref 45–117)
ALT SERPL-CCNC: 62 U/L (ref 12–78)
AMPHET UR QL SCN: POSITIVE
ANION GAP SERPL CALC-SCNC: 10 MMOL/L (ref 5–15)
APPEARANCE UR: CLEAR
AST SERPL-CCNC: 91 U/L (ref 15–37)
BACTERIA URNS QL MICRO: NEGATIVE /HPF
BARBITURATES UR QL SCN: NEGATIVE
BASOPHILS # BLD: 0 K/UL (ref 0–0.1)
BASOPHILS NFR BLD: 0 % (ref 0–1)
BENZODIAZ UR QL: NEGATIVE
BILIRUB SERPL-MCNC: 0.8 MG/DL (ref 0.2–1)
BILIRUB UR QL: NEGATIVE
BUN SERPL-MCNC: 9 MG/DL (ref 6–20)
BUN/CREAT SERPL: 13 (ref 12–20)
CALCIUM SERPL-MCNC: 8.9 MG/DL (ref 8.5–10.1)
CANNABINOIDS UR QL SCN: NEGATIVE
CHLORIDE SERPL-SCNC: 101 MMOL/L (ref 97–108)
CO2 SERPL-SCNC: 23 MMOL/L (ref 21–32)
COCAINE UR QL SCN: NEGATIVE
COLOR UR: ABNORMAL
COMMENT, HOLDF: NORMAL
CREAT SERPL-MCNC: 0.69 MG/DL (ref 0.55–1.02)
DIFFERENTIAL METHOD BLD: ABNORMAL
DRUG SCRN COMMENT,DRGCM: ABNORMAL
EOSINOPHIL # BLD: 0 K/UL (ref 0–0.4)
EOSINOPHIL NFR BLD: 0 % (ref 0–7)
EPITH CASTS URNS QL MICRO: ABNORMAL /LPF
ERYTHROCYTE [DISTWIDTH] IN BLOOD BY AUTOMATED COUNT: 18.1 % (ref 11.5–14.5)
GLOBULIN SER CALC-MCNC: 4.2 G/DL (ref 2–4)
GLUCOSE SERPL-MCNC: 114 MG/DL (ref 65–100)
GLUCOSE UR STRIP.AUTO-MCNC: NEGATIVE MG/DL
HCG UR QL: NEGATIVE
HCT VFR BLD AUTO: 37.9 % (ref 35–47)
HGB BLD-MCNC: 11.8 G/DL (ref 11.5–16)
HGB UR QL STRIP: NEGATIVE
IMM GRANULOCYTES # BLD AUTO: 0.1 K/UL (ref 0–0.04)
IMM GRANULOCYTES NFR BLD AUTO: 1 % (ref 0–0.5)
KETONES UR QL STRIP.AUTO: 80 MG/DL
LACTATE SERPL-SCNC: 1.4 MMOL/L (ref 0.4–2)
LACTATE SERPL-SCNC: 4 MMOL/L (ref 0.4–2)
LEUKOCYTE ESTERASE UR QL STRIP.AUTO: NEGATIVE
LYMPHOCYTES # BLD: 0.5 K/UL (ref 0.8–3.5)
LYMPHOCYTES NFR BLD: 6 % (ref 12–49)
MCH RBC QN AUTO: 25.4 PG (ref 26–34)
MCHC RBC AUTO-ENTMCNC: 31.1 G/DL (ref 30–36.5)
MCV RBC AUTO: 81.7 FL (ref 80–99)
METHADONE UR QL: NEGATIVE
MONOCYTES # BLD: 0.7 K/UL (ref 0–1)
MONOCYTES NFR BLD: 8 % (ref 5–13)
NEUTS SEG # BLD: 7 K/UL (ref 1.8–8)
NEUTS SEG NFR BLD: 85 % (ref 32–75)
NITRITE UR QL STRIP.AUTO: NEGATIVE
NRBC # BLD: 0 K/UL (ref 0–0.01)
NRBC BLD-RTO: 0 PER 100 WBC
OPIATES UR QL: NEGATIVE
PCP UR QL: NEGATIVE
PH UR STRIP: 5.5 (ref 5–8)
PLATELET # BLD AUTO: 324 K/UL (ref 150–400)
PMV BLD AUTO: 9.2 FL (ref 8.9–12.9)
POTASSIUM SERPL-SCNC: 3.7 MMOL/L (ref 3.5–5.1)
PROT SERPL-MCNC: 8 G/DL (ref 6.4–8.2)
PROT UR STRIP-MCNC: 30 MG/DL
RBC # BLD AUTO: 4.64 M/UL (ref 3.8–5.2)
RBC #/AREA URNS HPF: ABNORMAL /HPF (ref 0–5)
RBC MORPH BLD: ABNORMAL
SAMPLES BEING HELD,HOLD: NORMAL
SODIUM SERPL-SCNC: 134 MMOL/L (ref 136–145)
SP GR UR REFRACTOMETRY: <1.005
UA: UC IF INDICATED,UAUC: ABNORMAL
UROBILINOGEN UR QL STRIP.AUTO: 1 EU/DL (ref 0.2–1)
WBC # BLD AUTO: 8.3 K/UL (ref 3.6–11)
WBC URNS QL MICRO: ABNORMAL /HPF (ref 0–4)

## 2023-04-25 PROCEDURE — 85025 COMPLETE CBC W/AUTO DIFF WBC: CPT

## 2023-04-25 PROCEDURE — 74011250636 HC RX REV CODE- 250/636: Performed by: HOSPITALIST

## 2023-04-25 PROCEDURE — G0378 HOSPITAL OBSERVATION PER HR: HCPCS

## 2023-04-25 PROCEDURE — 80307 DRUG TEST PRSMV CHEM ANLYZR: CPT

## 2023-04-25 PROCEDURE — 80053 COMPREHEN METABOLIC PANEL: CPT

## 2023-04-25 PROCEDURE — 96375 TX/PRO/DX INJ NEW DRUG ADDON: CPT

## 2023-04-25 PROCEDURE — 70496 CT ANGIOGRAPHY HEAD: CPT

## 2023-04-25 PROCEDURE — 99285 EMERGENCY DEPT VISIT HI MDM: CPT

## 2023-04-25 PROCEDURE — C9113 INJ PANTOPRAZOLE SODIUM, VIA: HCPCS | Performed by: HOSPITALIST

## 2023-04-25 PROCEDURE — 94761 N-INVAS EAR/PLS OXIMETRY MLT: CPT

## 2023-04-25 PROCEDURE — 74240 X-RAY XM UPR GI TRC 1CNTRST: CPT

## 2023-04-25 PROCEDURE — 70553 MRI BRAIN STEM W/O & W/DYE: CPT

## 2023-04-25 PROCEDURE — 81025 URINE PREGNANCY TEST: CPT

## 2023-04-25 PROCEDURE — 95816 EEG AWAKE AND DROWSY: CPT | Performed by: PSYCHIATRY & NEUROLOGY

## 2023-04-25 PROCEDURE — 65270000046 HC RM TELEMETRY

## 2023-04-25 PROCEDURE — 74011000250 HC RX REV CODE- 250: Performed by: HOSPITALIST

## 2023-04-25 PROCEDURE — 96376 TX/PRO/DX INJ SAME DRUG ADON: CPT

## 2023-04-25 PROCEDURE — A9576 INJ PROHANCE MULTIPACK: HCPCS

## 2023-04-25 PROCEDURE — 36415 COLL VENOUS BLD VENIPUNCTURE: CPT

## 2023-04-25 PROCEDURE — 4A03X5D MEASUREMENT OF ARTERIAL FLOW, INTRACRANIAL, EXTERNAL APPROACH: ICD-10-PCS | Performed by: EMERGENCY MEDICINE

## 2023-04-25 PROCEDURE — A9576 INJ PROHANCE MULTIPACK: HCPCS | Performed by: HOSPITALIST

## 2023-04-25 PROCEDURE — 83605 ASSAY OF LACTIC ACID: CPT

## 2023-04-25 PROCEDURE — 74011000636 HC RX REV CODE- 636: Performed by: RADIOLOGY

## 2023-04-25 PROCEDURE — 74011250637 HC RX REV CODE- 250/637: Performed by: EMERGENCY MEDICINE

## 2023-04-25 PROCEDURE — 95816 EEG AWAKE AND DROWSY: CPT | Performed by: FAMILY MEDICINE

## 2023-04-25 PROCEDURE — 96374 THER/PROPH/DIAG INJ IV PUSH: CPT

## 2023-04-25 PROCEDURE — 74011250636 HC RX REV CODE- 250/636

## 2023-04-25 PROCEDURE — 81001 URINALYSIS AUTO W/SCOPE: CPT

## 2023-04-25 RX ORDER — DIAZEPAM 10 MG/2ML
20 INJECTION INTRAMUSCULAR
Status: DISCONTINUED | OUTPATIENT
Start: 2023-04-25 | End: 2023-04-26

## 2023-04-25 RX ORDER — SODIUM CHLORIDE 0.9 % (FLUSH) 0.9 %
10 SYRINGE (ML) INJECTION
Status: COMPLETED | OUTPATIENT
Start: 2023-04-25 | End: 2023-04-25

## 2023-04-25 RX ORDER — DIAZEPAM 10 MG/2ML
10 INJECTION INTRAMUSCULAR
Status: DISCONTINUED | OUTPATIENT
Start: 2023-04-25 | End: 2023-04-26

## 2023-04-25 RX ORDER — DIAZEPAM 5 MG/1
10 TABLET ORAL
Status: DISCONTINUED | OUTPATIENT
Start: 2023-04-25 | End: 2023-04-25

## 2023-04-25 RX ORDER — DIAZEPAM 5 MG/1
20 TABLET ORAL
Status: DISCONTINUED | OUTPATIENT
Start: 2023-04-25 | End: 2023-04-25

## 2023-04-25 RX ORDER — ACETAMINOPHEN 500 MG
1000 TABLET ORAL ONCE
Status: COMPLETED | OUTPATIENT
Start: 2023-04-25 | End: 2023-04-25

## 2023-04-25 RX ORDER — SODIUM CHLORIDE 0.9 % (FLUSH) 0.9 %
5-40 SYRINGE (ML) INJECTION AS NEEDED
Status: DISCONTINUED | OUTPATIENT
Start: 2023-04-25 | End: 2023-04-26 | Stop reason: HOSPADM

## 2023-04-25 RX ORDER — NALTREXONE HYDROCHLORIDE 50 MG/1
50 TABLET, FILM COATED ORAL DAILY
Status: DISCONTINUED | OUTPATIENT
Start: 2023-04-25 | End: 2023-04-26 | Stop reason: HOSPADM

## 2023-04-25 RX ORDER — LORAZEPAM 2 MG/ML
2 INJECTION, SOLUTION INTRAMUSCULAR; INTRAVENOUS
Status: DISCONTINUED | OUTPATIENT
Start: 2023-04-25 | End: 2023-04-26 | Stop reason: HOSPADM

## 2023-04-25 RX ORDER — FOLIC ACID 1 MG/1
1 TABLET ORAL DAILY
Status: DISCONTINUED | OUTPATIENT
Start: 2023-04-25 | End: 2023-04-26 | Stop reason: HOSPADM

## 2023-04-25 RX ORDER — SODIUM CHLORIDE 0.9 % (FLUSH) 0.9 %
10 SYRINGE (ML) INJECTION
Status: ACTIVE | OUTPATIENT
Start: 2023-04-25 | End: 2023-04-26

## 2023-04-25 RX ORDER — DEXTROSE, SODIUM CHLORIDE, AND POTASSIUM CHLORIDE 5; .9; .15 G/100ML; G/100ML; G/100ML
125 INJECTION INTRAVENOUS CONTINUOUS
Status: DISCONTINUED | OUTPATIENT
Start: 2023-04-25 | End: 2023-04-26 | Stop reason: HOSPADM

## 2023-04-25 RX ORDER — SODIUM CHLORIDE 0.9 % (FLUSH) 0.9 %
5-40 SYRINGE (ML) INJECTION EVERY 8 HOURS
Status: DISCONTINUED | OUTPATIENT
Start: 2023-04-25 | End: 2023-04-26 | Stop reason: HOSPADM

## 2023-04-25 RX ORDER — LANOLIN ALCOHOL/MO/W.PET/CERES
100 CREAM (GRAM) TOPICAL DAILY
Status: DISCONTINUED | OUTPATIENT
Start: 2023-04-25 | End: 2023-04-26 | Stop reason: HOSPADM

## 2023-04-25 RX ORDER — BUSPIRONE HYDROCHLORIDE 10 MG/1
30 TABLET ORAL 2 TIMES DAILY
Status: DISCONTINUED | OUTPATIENT
Start: 2023-04-25 | End: 2023-04-26 | Stop reason: HOSPADM

## 2023-04-25 RX ADMIN — GADOTERIDOL 10 ML: 279.3 INJECTION, SOLUTION INTRAVENOUS at 22:25

## 2023-04-25 RX ADMIN — SODIUM CHLORIDE, PRESERVATIVE FREE 40 MG: 5 INJECTION INTRAVENOUS at 10:46

## 2023-04-25 RX ADMIN — SODIUM CHLORIDE, PRESERVATIVE FREE 10 ML: 5 INJECTION INTRAVENOUS at 22:26

## 2023-04-25 RX ADMIN — IOHEXOL 100 ML: 350 INJECTION, SOLUTION INTRAVENOUS at 02:18

## 2023-04-25 RX ADMIN — POTASSIUM CHLORIDE, DEXTROSE MONOHYDRATE AND SODIUM CHLORIDE 125 ML/HR: 150; 5; 900 INJECTION, SOLUTION INTRAVENOUS at 08:54

## 2023-04-25 RX ADMIN — DIAZEPAM 10 MG: 5 INJECTION, SOLUTION INTRAMUSCULAR; INTRAVENOUS at 09:22

## 2023-04-25 RX ADMIN — POTASSIUM CHLORIDE, DEXTROSE MONOHYDRATE AND SODIUM CHLORIDE 125 ML/HR: 150; 5; 900 INJECTION, SOLUTION INTRAVENOUS at 19:58

## 2023-04-25 RX ADMIN — SODIUM CHLORIDE, PRESERVATIVE FREE 40 MG: 5 INJECTION INTRAVENOUS at 21:37

## 2023-04-25 RX ADMIN — SODIUM CHLORIDE, PRESERVATIVE FREE 10 ML: 5 INJECTION INTRAVENOUS at 23:00

## 2023-04-25 RX ADMIN — FOLIC ACID: 5 INJECTION, SOLUTION INTRAMUSCULAR; INTRAVENOUS; SUBCUTANEOUS at 11:53

## 2023-04-25 RX ADMIN — ACETAMINOPHEN 1000 MG: 500 TABLET ORAL at 03:55

## 2023-04-25 RX ADMIN — GADOTERIDOL 10 ML: 279.3 INJECTION, SOLUTION INTRAVENOUS at 22:00

## 2023-04-25 RX ADMIN — DIAZEPAM 20 MG: 5 INJECTION, SOLUTION INTRAMUSCULAR; INTRAVENOUS at 11:59

## 2023-04-25 NOTE — PROGRESS NOTES
Bedside and Verbal shift change report given to [facility] (oncoming nurse) by Omi Mijares (offgoing nurse). Report included the following information SBAR, Kardex, Intake/Output, MAR, Recent Results, and Cardiac Rhythm NSR .

## 2023-04-25 NOTE — CONSULTS
[address] Suzie Mckeon NOTE  [facility]  [phone] NP in-hospital cell phone M-F until 4:30  After 5pm or on weekends, please call  for physician on call        NAME:  Gwendalyn Collet   :   1980   MRN:   [id number]       Referring Physician: Harrison Madden Date: 2023 11:30 AM    Chief Complaint: intermittent dysphagia to solids and liquids     History of Present Illness:  Patient is a 38 y.o. who is seen in consultation at the request of Dr. Tigist Mae for intermittent dysphagia to solids and liquids. Medical history as listed below. She presented for seizure activity. She notes 2-4 months of dysphagia to solids and liquids, requires regurgitation. She denies nausea or abdominal pain. +NSAID's and alcohol. Colonoscopy 2021 Dr. Pricila Chu for ileitis on CT, normal    I have reviewed the emergency room note, hospital admission note, notes by all other clinicians who have seen the patient during this hospitalization to date. I have reviewed the problem list and the reason for this hospitalization. I have reviewed the allergies and the medications the patient was taking at home prior to this hospitalization. PMH:  Past Medical History:   Diagnosis Date    Anxiety     Depression        PSH:  Past Surgical History:   Procedure Laterality Date    COLONOSCOPY N/A 2021    COLONOSCOPY   :- performed by Suraj Mckinney MD at [facility] ENDOSCOPY       Allergies: Allergies   Allergen Reactions    Compazine [Prochlorperazine] Nausea Only       Home Medications:  Prior to Admission Medications   Prescriptions Last Dose Informant Patient Reported? Taking? Doxepin 6 mg tab   No No   Sig: Take 6 mg by mouth nightly. May take if awakens in middle of night.   Indications: difficulty sleeping   buPROPion XL (WELLBUTRIN XL) 300 mg XL tablet   No No   Sig: Take 1 Tablet by mouth every morning. Patient taking differently: Take 1 Tablet by mouth daily. busPIRone (BUSPAR) 30 mg tablet   No No   Sig: TAKE 1 TABLET BY MOUTH TWICE A DAY   naltrexone (DEPADE) 50 mg tablet   No No   Sig: TAKE 1 TABLET BY MOUTH EVERY DAY      Facility-Administered Medications: None       Hospital Medications:  Current Facility-Administered Medications   Medication Dose Route Frequency    sodium chloride (NS) flush 5-40 mL  5-40 mL IntraVENous Q8H    sodium chloride (NS) flush 5-40 mL  5-40 mL IntraVENous PRN    LORazepam (ATIVAN) 2 mg/mL injection 2 mg  2 mg IntraVENous Q5MIN PRN    [Held by provider] folic acid (FOLVITE) tablet 1 mg  1 mg Oral DAILY    [Held by provider] thiamine HCL (B-1) tablet 100 mg  100 mg Oral DAILY    multivitamin, tx-iron-ca-min (THERA-M w/ IRON) tablet 1 Tablet  1 Tablet Oral DAILY    dextrose 5% - 0.9% NaCl with KCl 20 mEq/L infusion  125 mL/hr IntraVENous CONTINUOUS    0.9% sodium chloride 1,000 mL with thiamine 531 mg, folic acid 1 mg infusion   IntraVENous Q24H    [Held by provider] naltrexone (DEPADE) tablet 50 mg  50 mg Oral DAILY    [Held by provider] busPIRone (BUSPAR) tablet 30 mg  30 mg Oral BID    diazePAM (VALIUM) injection 10 mg  10 mg IntraVENous Q1H PRN    diazePAM (VALIUM) injection 20 mg  20 mg IntraVENous Q1H PRN    . PHARMACY TO SUBSTITUTE PER PROTOCOL (Reordered from: buPROPion XL (WELLBUTRIN XL) 300 mg XL tablet)    Per Protocol    pantoprazole (PROTONIX) 40 mg in 0.9% sodium chloride 10 mL injection  40 mg IntraVENous Q12H     Current Outpatient Medications   Medication Sig    Doxepin 6 mg tab Take 6 mg by mouth nightly. May take if awakens in middle of night. Indications: difficulty sleeping    naltrexone (DEPADE) 50 mg tablet TAKE 1 TABLET BY MOUTH EVERY DAY    buPROPion XL (WELLBUTRIN XL) 300 mg XL tablet Take 1 Tablet by mouth every morning.  (Patient taking differently: Take 1 Tablet by mouth daily.)    busPIRone (BUSPAR) 30 mg tablet TAKE 1 TABLET BY MOUTH TWICE A DAY       Social History:  Social History     Tobacco Use    Smoking status: Some Days     Types: Cigarettes    Smokeless tobacco: Never   Substance Use Topics    Alcohol use: Yes     Comment: 5 beers/wine       Family History:  No family history on file. Review of Systems:  Constitutional: negative fever, negative chills, negative weight loss  Eyes:   negative visual changes  ENT:   negative sore throat, tongue or lip swelling  Respiratory:  negative cough, negative dyspnea  Cards:  negative for chest pain, palpitations, lower extremity edema  GI:   See HPI  :  negative for frequency, dysuria  Integument:  negative for rash and pruritus  Heme:  negative for easy bruising and gum/nose bleeding  Musculoskeletal:negative for myalgias, back pain and muscle weakness  Neuro:    + for headaches, dizziness, vertigo  Psych: negative for feelings of anxiety, depression     Objective:   Patient Vitals for the past 8 hrs:   BP Temp Pulse Resp SpO2   04/25/23 1003 (!) 132/97 -- 95 27 --   04/25/23 0900 (!) 128/101 98.3 °F (36.8 °C) 88 20 99 %     No intake/output data recorded. No intake/output data recorded.     EXAM:     CONST:  Pleasant lady lying in bed, jittery   NEURO:  alert and oriented x 3, word finding difficulty    HEENT: EOMI, no scleral icterus   LUNGS: No increased WOB   CARD:   tachycardia   ABD:  soft, no tenderness, no rebound, no masses, non distended   EXT:  no edema, warm   PSYCH: full, not anxious     Data Review     Recent Labs     04/25/23  0105 04/24/23  1655   WBC 8.3 6.4   HGB 11.8 11.6   HCT 37.9 37.0    322     Recent Labs     04/25/23  0105 04/24/23  1655   * 134*   K 3.7 3.2*    99   CO2 23 21   BUN 9 9   CREA 0.69 0.73   * 144*   CA 8.9 9.6     Recent Labs     04/25/23  0105 04/24/23  1655   AP 57 60   TP 8.0 8.2   ALB 3.8 3.9   GLOB 4.2* 4.3*     Recent Labs     04/24/23  1655   INR 1.0   PTP 10.7          Assessment:     Dysphagia x months to solids and liquids, requiring regurgitation   Seizures  Alcohol withdrawal      Patient Active Problem List   Diagnosis Code    Intussusception intestine ([facility] K56.1    Bipolar depression ([facility] F31.9    Mild alcohol use disorder, in sustained remission F10.11    IUD (intrauterine device) in place Z97.5    Difficulty concentrating R41.840    Moderate mixed hyperlipidemia not requiring statin therapy E78.2    Seizure ([facility] R56.9     Plan:       NPO  BID PPI  UGI series  Consider EGD next once cleared by neurology   Patient discussed with Dr. Tolentino Person  Thank you for allowing me to participate in care of Pablito Barba     Signed By: Sanjiv Ramires NP     4/25/2023  11:30 AM

## 2023-04-25 NOTE — PROGRESS NOTES
Progress note  Mike Lincoln MD      Date of Service:  4/25/2023      History of Presenting Illness:   Brian Munoz is a 38 y.o. female with apmhx etoh dependence on naltrexone, and anxiety who presents with seizure, and dysphagia. She states that she has had intermittent dysphagia for solids, and liquids for the past 4 months. Today, she was at work speaking with a colleague when her speech became altered followed by loss of consciousness described as a seizure. She was evaluated in the ED, and sent hoe with nuerology follow up. This evening her  woke up, and noted that she wsa having seizure like activity. He called EMS who noted her to be post ictal, and returned her to the ED for further evaluation. In the ED,   she was tachycardic to 106. Other VSS. Labs showed  K+ 3.2,  lacti 8.1 UDS + for amphetamines, ETOH 20,. CT head was negative. EKG showed sinus tachy sherly 107. In the ED, she received tylenol                                                       Subjective/interval history   Patient seen and examined in the ED room #23. RN and EEG tech at the bedside. Patient alert or x3. She had tremors in the hand which she says is worse than usual.  Has mild headache and nausea, denied auditory or visual hallucinations. Reviewed PTA medications. In her back she has bottles of bupropion  mg to be taken daily which she is taking more recently and also has a bottle of bupropion short-acting 150 mg twice daily last filled about a year ago. It appears she was switched from the IR to XL form. She has a bottle of BuSpar pills, on the pill last refill was 4/22. Patient admitted she does not take the medications consistently. No naltrexone but she says she has at home. I reviewed the [place] .   The only medication listed is gabapentin last filled in June 2022    Plan:   Seizure likely due to EtOH withdrawal  Alcohol dependence with early signs of withdrawal  -To be managed by symptom triggered CIWA protocol with IV diazepam  -IV fluids, IV thiamine and folic acid  -EEG underway. MRI brain pending. Neurology consulted  -Patient reports being on naltrexone. Dysphagia, suspect esophagitis  -N.p.o.    -IV PPI  -GI consult, she will likely need EGD    Anxiety and depression.  -At home on bupropion and BuSpar        DIET: DIET NPO   ISOLATION PRECAUTIONS: There are currently no Active Isolations  CODE STATUS: Full Code   DVT PROPHYLAXIS: SCDs  FUNCTIONAL STATUS PRIOR TO HOSPITALIZATION: Fully active and ambulatory; able to carry on all self-care without restriction. Ambulatory status/function: By self   EARLY MOBILITY ASSESSMENT: Recommend routine ambulation while hospitalized with the assistance of nursing staff  ANTICIPATED DISCHARGE: 24-48 hours. ANTICIPATED DISPOSITION: Home  EMERGENCY CONTACT/SURROGATE DECISION MAKER: spouse  REVIEW OF SYSTEMS:  A comprehensive review of systems was negative except for that written in the History of Present Illness. Past Medical History:   Diagnosis Date    Anxiety     Depression       Past Surgical History:   Procedure Laterality Date    COLONOSCOPY N/A 8/26/2021    COLONOSCOPY   :- performed by Jocelyn Urena MD at [facility] ENDOSCOPY     Prior to Admission medications    Medication Sig Start Date End Date Taking? Authorizing Provider   Doxepin 6 mg tab Take 6 mg by mouth nightly. May take if awakens in middle of night. Indications: difficulty sleeping 1/10/23   Dorota Curran NP   naltrexone (DEPADE) 50 mg tablet TAKE 1 TABLET BY MOUTH EVERY DAY 1/10/23   Dorota Curran NP   buPROPion XL (WELLBUTRIN XL) 300 mg XL tablet Take 1 Tablet by mouth every morning. Patient taking differently: Take 1 Tablet by mouth daily.  9/27/22   Dorota Curran NP   busPIRone (BUSPAR) 30 mg tablet TAKE 1 TABLET BY MOUTH TWICE A DAY 9/27/22   Dorota Curran NP     Allergies   Allergen Reactions    Compazine [Prochlorperazine] Nausea Only      No family history on file. Social History:  reports that she has been smoking cigarettes. She has never used smokeless tobacco. She reports current alcohol use. She reports that she does not currently use drugs. Social Determinants of Health     Tobacco Use: High Risk    Smoking Tobacco Use: Some Days    Smokeless Tobacco Use: Never    Passive Exposure: Not on file   Alcohol Use: Not on file   Financial Resource Strain: Not on file   Food Insecurity: Not on file   Transportation Needs: Not on file   Physical Activity: Not on file   Stress: Not on file   Social Connections: Not on file   Intimate Partner Violence: Not on file   Depression: Not at risk    PHQ-2 Score: 0   Housing Stability: Not on file        Medications were reconciled to the best of my ability given all available resources at the time of admission. Route is PO if not otherwise noted. Family and social history were personally reviewed, all pertinent and relevant details are outlined as above. Objective:   Visit Vitals  BP (!) 147/92 (BP 1 Location: Left upper arm, BP Patient Position: At rest;Sitting)   Pulse (!) 106   Temp 97.9 °F (36.6 °C)   Resp 20   SpO2 100%      O2 Device: None    PHYSICAL EXAM:   Arrange an EEG take at the bedside during this exam.  General: Alert and oriented, very shaky.   HEENT: PEERL, EOMI, moist mucus membranes left periorbital ecchymosis   Neck: Supple, no JVD, no meningeal signs  Chest: Clear to auscultation bilaterally   CVS: RRR, S1 S2 heard, no murmurs/rubs/gallops  Abd: Soft, non-tender, non-distended, +bowel sounds   Ext: No clubbing, no cyanosis, no edema  Neuro/Psych: Pleasant mood and affect, CN 2-12 grossly intact, sensory grossly within normal limit, Strength 5/5 in all extremities, +tremor  Cap refill: Brisk, less than 3 seconds  Pulses: 2+radial pulses  Skin: Warm, dry, without rashes or lesions    Data Review:   I have independently reviewed and interpreted patient's lab and all other diagnostic data    Abnormal Labs Reviewed   CBC WITH AUTOMATED DIFF - Abnormal; Notable for the following components:       Result Value    MCH 25.4 (*)     RDW 18.1 (*)     NEUTROPHILS 85 (*)     LYMPHOCYTES 6 (*)     IMMATURE GRANULOCYTES 1 (*)     ABS. LYMPHOCYTES 0.5 (*)     ABS. IMM. GRANS. 0.1 (*)     All other components within normal limits   METABOLIC PANEL, COMPREHENSIVE - Abnormal; Notable for the following components:    Sodium 134 (*)     Glucose 114 (*)     AST (SGOT) 91 (*)     Globulin 4.2 (*)     A-G Ratio 0.9 (*)     All other components within normal limits   DRUG SCREEN, URINE - Abnormal; Notable for the following components:    AMPHETAMINES Positive (*)     All other components within normal limits   URINALYSIS W/ REFLEX CULTURE - Abnormal; Notable for the following components:    Protein 30 (*)     Ketone 80 (*)     All other components within normal limits       All Micro Results       None            IMAGING:   CTA HEAD NECK W CONT   Final Result      CTA    1. CTA neck demonstrates no large vessel occlusion, high-grade stenosis or   aneurysm. 2. CTA head demonstrates no large vessel occlusion, high-grade stenosis or   aneurysm. 3. No intracranial mass or enhancing lesion. Others:   Chronic right sphenoid sinusitis.       MRI BRAIN W WO CONT    (Results Pending)        ECG/ECHO:    Results for orders placed or performed during the hospital encounter of 04/24/23   EKG, 12 LEAD, INITIAL   Result Value Ref Range    Ventricular Rate 107 BPM    Atrial Rate 107 BPM    P-R Interval 194 ms    QRS Duration 88 ms    Q-T Interval 354 ms    QTC Calculation (Bezet) 472 ms    Calculated P Axis 1 degrees    Calculated R Axis 11 degrees    Calculated T Axis 21 degrees    Diagnosis       Sinus tachycardia  Minimal voltage criteria for LVH, may be normal variant ( Rojas product )  Borderline ECG  When compared with ECG of 01-SEP-2022 05:47,  No significant change was found  Confirmed by Mana Carroll MD ([id number]) on 4/24/2023 8:27:03 PM            Notes reviewed from all clinical/nonclinical/nursing services involved in patient's clinical care. Care coordination discussions were held with appropriate clinical/nonclinical/ nursing providers based on care coordination needs. Signed By: Hang Doan MD     April 25, 2023         Please note that this dictation may have been completed with Dragon, the computer voice recognition software. Quite often unanticipated grammatical, syntax, homophones, and other interpretive errors are inadvertently transcribed by the computer software. Please disregard these errors. Please excuse any errors that have escaped final proofreading.

## 2023-04-25 NOTE — ED PROVIDER NOTES
The history is provided by the patient. No  was used. Seizure   This is a recurrent problem. The current episode started less than 1 hour ago. The problem has been resolved. There were 2 - 3 seizures. The most recent episode lasted less than 30 seconds. Associated symptoms include muscle weakness. Pertinent negatives include no confusion, no headaches, no speech difficulty, no visual disturbance, no neck stiffness, no sore throat, no chest pain, no cough, no nausea, no vomiting and no diarrhea. Characteristics include rhythmic jerking. Characteristics do not include bladder incontinence or bit tongue. The episode was Witnessed. There was No sensation of an aura present. There was return to baseline postseizure. The seizures Did not continue in the ED. Possible causes include change in alcohol use. Possible causes do not include medication or dosage change, sleep deprivation, missed seizure meds, recent illness, stress, head injury or missed seizure meds. There has been no fever. She reports No chest pain, no confusion, no visual disturbance, no diarrhea, no vomiting, no headaches, no sore throat, muscle weakness, no stiff neck, no speech difficulty, and no cough. Past Medical History:   Diagnosis Date    Anxiety     Depression        Past Surgical History:   Procedure Laterality Date    COLONOSCOPY N/A 8/26/2021    COLONOSCOPY   :- performed by Willard Pascual MD at [facility] ENDOSCOPY         No family history on file.     Social History     Socioeconomic History    Marital status:      Spouse name: Not on file    Number of children: Not on file    Years of education: Not on file    Highest education level: Not on file   Occupational History    Not on file   Tobacco Use    Smoking status: Some Days     Types: Cigarettes    Smokeless tobacco: Never   Substance and Sexual Activity    Alcohol use: Yes     Comment: 5 beers/wine    Drug use: Not Currently    Sexual activity: Not on file Other Topics Concern    Not on file   Social History Narrative    Not on file     Social Determinants of Health     Financial Resource Strain: Not on file   Food Insecurity: Not on file   Transportation Needs: Not on file   Physical Activity: Not on file   Stress: Not on file   Social Connections: Not on file   Intimate Partner Violence: Not on file   Housing Stability: Not on file         ALLERGIES: Compazine [prochlorperazine]    Review of Systems   Constitutional:  Negative for activity change, chills and fever. HENT:  Negative for nosebleeds, sore throat, trouble swallowing and voice change. Eyes:  Negative for visual disturbance. Respiratory:  Negative for cough and shortness of breath. Cardiovascular:  Negative for chest pain and palpitations. Gastrointestinal:  Negative for abdominal pain, constipation, diarrhea, nausea and vomiting. Genitourinary:  Negative for bladder incontinence, difficulty urinating, dysuria, hematuria and urgency. Musculoskeletal:  Negative for back pain, neck pain and neck stiffness. Skin:  Negative for color change. Allergic/Immunologic: Negative for immunocompromised state. Neurological:  Positive for seizures. Negative for dizziness, syncope, speech difficulty, weakness, light-headedness, numbness and headaches. Psychiatric/Behavioral:  Negative for behavioral problems, confusion, hallucinations, self-injury and suicidal ideas. Vitals:    04/25/23 0046   BP: (!) 147/92   Pulse: (!) 106   Resp: 20   Temp: 97.9 °F (36.6 °C)   SpO2: 100%            Physical Exam  Vitals and nursing note reviewed. Constitutional:       General: She is not in acute distress. Appearance: She is well-developed. She is not diaphoretic. HENT:      Head: Normocephalic and atraumatic. Eyes:      Pupils: Pupils are equal, round, and reactive to light. Cardiovascular:      Rate and Rhythm: Normal rate and regular rhythm. Heart sounds: Normal heart sounds.  No murmur heard.    No friction rub. No gallop. Pulmonary:      Effort: Pulmonary effort is normal. No respiratory distress. Breath sounds: Normal breath sounds. No wheezing. Abdominal:      General: Bowel sounds are normal. There is no distension. Palpations: Abdomen is soft. Tenderness: There is no abdominal tenderness. There is no guarding or rebound. Musculoskeletal:         General: Normal range of motion. Cervical back: Normal range of motion and neck supple. Skin:     General: Skin is warm. Findings: No rash. Neurological:      Mental Status: She is alert and oriented to person, place, and time. Psychiatric:         Behavior: Behavior normal.         Thought Content: Thought content normal.         Judgment: Judgment normal.        Medical Decision Making  Amount and/or Complexity of Data Reviewed  Labs: ordered. Radiology: ordered. Risk  Prescription drug management. Decision regarding hospitalization. This is a 60-year-old female with past medical history, review of systems, physical exam as above, presenting with complaints of seizure, dysphagia. Patient states she was here earlier today, for first-time seizure, and following a fall with left-sided facial injury. Patient received work-up here in emergency department, with reassuring lab work, CT imaging, discharged to follow-up with neurology. She states this evening she was awoken by her  stating that she was having another seizure, and thus EMS was summoned. She denies medical problems, states long-term alcohol abuse, started tapering her alcohol abruptly approximate 1 month ago, and states since then approximately 1-2 drinks per day, her last drink being 2 days ago. She endorses approximate 2 weeks of dysphagia, states she has resorted to eating soup, she is having difficulty swallowing, without pain. She endorses nausea, without abdominal pain.   She denies visual disturbance, focal weakness or paresthesias, ataxia. Upon arrival she is noted to be mildly hypertensive, afebrile, mildly tachycardic, satting well on room air. She is noted to have some left periorbital ecchymosis, secondary to previous fall, nonfocal neurologic exam, clear breath sounds to auscultation, regular rate and rhythm without murmurs gallops or rubs. Less likely DTs secondary to alcohol withdrawal given prolonged taper, concerning report of dysphagia. Plan to obtain CTA given normal CT earlier today, and likely admit for further care and evaluation. Procedures    Perfect Serve Consult for Admission  3:14 AM    ED Room Number: VP72/54  Patient Name and age:  Alondra Grossman 38 y.o.  female  Working Diagnosis:   1. Seizure ([address]    2.  Dysphagia, unspecified type        COVID-19 Suspicion:  no  Sepsis present:  no  Reassessment needed: no  Code Status:  Full Code  Readmission: no  Isolation Requirements:  no  Recommended Level of Care:  med/surg  Department: [facility] ED - [phone]  Admitting Provider: d/w Dr. Geneva Diane

## 2023-04-25 NOTE — ROUTINE PROCESS
TRANSFER - OUT REPORT:    Verbal report given to 50 Lore Burton RN(name) on Delores Trinidad  being transferred to [facility](unit) for routine progression of care       Report consisted of patients Situation, Background, Assessment and   Recommendations(SBAR). Information from the following report(s) SBAR, ED Summary, Intake/Output, MAR, and Recent Results was reviewed with the receiving nurse. Lines:   Peripheral IV 04/25/23 Right Antecubital (Active)   Site Assessment Clean, dry, & intact 04/25/23 0227   Phlebitis Assessment 0 04/25/23 0227   Infiltration Assessment 0 04/25/23 0227   Dressing Status Clean, dry, & intact 04/25/23 5147        Opportunity for questions and clarification was provided.       Patient transported with:   [facility]

## 2023-04-25 NOTE — PROGRESS NOTES
Progress note  Ricardo Alfaro MD      Date of Service:  4/25/2023      History of Presenting Illness:   Juni Salas is a 38 y.o. female with apmhx etoh dependence on naltrexone, and anxiety who presents with seizure, and dysphagia. She states that she has had intermittent dysphagia for solids, and liquids for the past 4 months. Today, she was at work speaking with a colleague when her speech became altered followed by loss of consciousness described as a seizure. She was evaluated in the ED, and sent hoe with nuerology follow up. This evening her  woke up, and noted that she wsa having seizure like activity. He called EMS who noted her to be post ictal, and returned her to the ED for further evaluation. In the ED,   she was tachycardic to 106. Other VSS. Labs showed  K+ 3.2,  lacti 8.1 UDS + for amphetamines, ETOH 20,. CT head was negative. EKG showed sinus tachy sherly 107. In the ED, she received tylenol                                                       Subjective/interval history   Patient seen and examined in the ED room #23. RN and EEG tech at the bedside. Patient alert or x3. She had tremors in the hand which she says is worse than usual.  Has mild headache and nausea, denied auditory or visual hallucinations. Reviewed PTA medications. In her back she has bottles of bupropion  mg to be taken daily which she is taking more recently and also has a bottle of bupropion short-acting 150 mg twice daily last filled about a year ago. It appears she was switched from the IR to XL form. She has a bottle of BuSpar pills, on the pill last refill was 4/22. Patient admitted she does not take the medications consistently. No naltrexone but she says she has at home. I reviewed the [place] .   The only medication listed is gabapentin last filled in June 2022    Plan:   Seizure likely due to EtOH withdrawal  Alcohol dependence with early signs of withdrawal  -To be managed by symptom triggered CIWA protocol with IV diazepam  -IV fluids, IV thiamine and folic acid  -EEG underway. MRI brain pending. Neurology consulted  -Patient reports being on naltrexone. Dysphagia, suspect esophagitis  -N.p.o.    -IV PPI  -GI consult, she will likely need EGD    Anxiety and depression.  -At home on bupropion and BuSpar        DIET: DIET NPO   ISOLATION PRECAUTIONS: There are currently no Active Isolations  CODE STATUS: Full Code   DVT PROPHYLAXIS: SCDs  FUNCTIONAL STATUS PRIOR TO HOSPITALIZATION: Fully active and ambulatory; able to carry on all self-care without restriction. Ambulatory status/function: By self   EARLY MOBILITY ASSESSMENT: Recommend routine ambulation while hospitalized with the assistance of nursing staff  ANTICIPATED DISCHARGE: 24-48 hours. ANTICIPATED DISPOSITION: Home  EMERGENCY CONTACT/SURROGATE DECISION MAKER: spouse  REVIEW OF SYSTEMS:  A comprehensive review of systems was negative except for that written in the History of Present Illness. Past Medical History:   Diagnosis Date    Anxiety     Depression       Past Surgical History:   Procedure Laterality Date    COLONOSCOPY N/A 8/26/2021    COLONOSCOPY   :- performed by Gonzalo Frazier MD at [facility] ENDOSCOPY     Prior to Admission medications    Medication Sig Start Date End Date Taking? Authorizing Provider   Doxepin 6 mg tab Take 6 mg by mouth nightly. May take if awakens in middle of night. Indications: difficulty sleeping 1/10/23   Martina Curet, JEFFREY   naltrexone (DEPADE) 50 mg tablet TAKE 1 TABLET BY MOUTH EVERY DAY 1/10/23   Martina Cleot, NP   buPROPion XL (WELLBUTRIN XL) 300 mg XL tablet Take 1 Tablet by mouth every morning. Patient taking differently: Take 1 Tablet by mouth daily.  9/27/22   Martina Cleot, NP   busPIRone (BUSPAR) 30 mg tablet TAKE 1 TABLET BY MOUTH TWICE A DAY 9/27/22   Martina Cleot, NP     Allergies   Allergen Reactions    Compazine [Prochlorperazine] Nausea Only      No family history on file. Social History:  reports that she has been smoking cigarettes. She has never used smokeless tobacco. She reports current alcohol use. She reports that she does not currently use drugs. Social Determinants of Health     Tobacco Use: High Risk    Smoking Tobacco Use: Some Days    Smokeless Tobacco Use: Never    Passive Exposure: Not on file   Alcohol Use: Not on file   Financial Resource Strain: Not on file   Food Insecurity: Not on file   Transportation Needs: Not on file   Physical Activity: Not on file   Stress: Not on file   Social Connections: Not on file   Intimate Partner Violence: Not on file   Depression: Not at risk    PHQ-2 Score: 0   Housing Stability: Not on file        Medications were reconciled to the best of my ability given all available resources at the time of admission. Route is PO if not otherwise noted. Family and social history were personally reviewed, all pertinent and relevant details are outlined as above. Objective:   Visit Vitals  BP (!) 147/92 (BP 1 Location: Left upper arm, BP Patient Position: At rest;Sitting)   Pulse (!) 106   Temp 97.9 °F (36.6 °C)   Resp 20   SpO2 100%      O2 Device: None    PHYSICAL EXAM:   Arrange an EEG take at the bedside during this exam.  General: Alert and oriented, very shaky.   HEENT: PEERL, EOMI, moist mucus membranes left periorbital ecchymosis   Neck: Supple, no JVD, no meningeal signs  Chest: Clear to auscultation bilaterally   CVS: RRR, S1 S2 heard, no murmurs/rubs/gallops  Abd: Soft, non-tender, non-distended, +bowel sounds   Ext: No clubbing, no cyanosis, no edema  Neuro/Psych: Pleasant mood and affect, CN 2-12 grossly intact, sensory grossly within normal limit, Strength 5/5 in all extremities, +tremor  Cap refill: Brisk, less than 3 seconds  Pulses: 2+radial pulses  Skin: Warm, dry, without rashes or lesions    Data Review:   I have independently reviewed and interpreted patient's lab and all other diagnostic data    Abnormal Labs Reviewed   CBC WITH AUTOMATED DIFF - Abnormal; Notable for the following components:       Result Value    MCH 25.4 (*)     RDW 18.1 (*)     NEUTROPHILS 85 (*)     LYMPHOCYTES 6 (*)     IMMATURE GRANULOCYTES 1 (*)     ABS. LYMPHOCYTES 0.5 (*)     ABS. IMM. GRANS. 0.1 (*)     All other components within normal limits   METABOLIC PANEL, COMPREHENSIVE - Abnormal; Notable for the following components:    Sodium 134 (*)     Glucose 114 (*)     AST (SGOT) 91 (*)     Globulin 4.2 (*)     A-G Ratio 0.9 (*)     All other components within normal limits   DRUG SCREEN, URINE - Abnormal; Notable for the following components:    AMPHETAMINES Positive (*)     All other components within normal limits   URINALYSIS W/ REFLEX CULTURE - Abnormal; Notable for the following components:    Protein 30 (*)     Ketone 80 (*)     All other components within normal limits       All Micro Results       None            IMAGING:   CTA HEAD NECK W CONT   Final Result      CTA    1. CTA neck demonstrates no large vessel occlusion, high-grade stenosis or   aneurysm. 2. CTA head demonstrates no large vessel occlusion, high-grade stenosis or   aneurysm. 3. No intracranial mass or enhancing lesion. Others:   Chronic right sphenoid sinusitis.       MRI BRAIN W WO CONT    (Results Pending)        ECG/ECHO:    Results for orders placed or performed during the hospital encounter of 04/24/23   EKG, 12 LEAD, INITIAL   Result Value Ref Range    Ventricular Rate 107 BPM    Atrial Rate 107 BPM    P-R Interval 194 ms    QRS Duration 88 ms    Q-T Interval 354 ms    QTC Calculation (Bezet) 472 ms    Calculated P Axis 1 degrees    Calculated R Axis 11 degrees    Calculated T Axis 21 degrees    Diagnosis       Sinus tachycardia  Minimal voltage criteria for LVH, may be normal variant ( Rojas product )  Borderline ECG  When compared with ECG of 01-SEP-2022 05:47,  No significant change was found  Confirmed by Freddie Tillman MD ([id number]) on 4/24/2023 8:27:03 PM            Notes reviewed from all clinical/nonclinical/nursing services involved in patient's clinical care. Care coordination discussions were held with appropriate clinical/nonclinical/ nursing providers based on care coordination needs. Signed By: Lorenzo Price MD     April 25, 2023         Please note that this dictation may have been completed with Dragon, the computer voice recognition software. Quite often unanticipated grammatical, syntax, homophones, and other interpretive errors are inadvertently transcribed by the computer software. Please disregard these errors. Please excuse any errors that have escaped final proofreading.

## 2023-04-25 NOTE — ED TRIAGE NOTES
Patient in through ems with complaints of seizure. Patient was seen here at [facility] ed for same a few hrs earlier. She has no hx of seizures and was ultimately dc'd home. Tonight her  woke up in bed to her having a full body seizure that lasted a minute.  EMS arrived and reported she was postdictal.

## 2023-04-25 NOTE — ED PROVIDER NOTES
The history is provided by the patient. No  was used. Seizure   This is a recurrent problem. The current episode started less than 1 hour ago. The problem has been resolved. There were 2 - 3 seizures. The most recent episode lasted less than 30 seconds. Associated symptoms include muscle weakness. Pertinent negatives include no confusion, no headaches, no speech difficulty, no visual disturbance, no neck stiffness, no sore throat, no chest pain, no cough, no nausea, no vomiting and no diarrhea. Characteristics include rhythmic jerking. Characteristics do not include bladder incontinence or bit tongue. The episode was Witnessed. There was No sensation of an aura present. There was return to baseline postseizure. The seizures Did not continue in the ED. Possible causes include change in alcohol use. Possible causes do not include medication or dosage change, sleep deprivation, missed seizure meds, recent illness, stress, head injury or missed seizure meds. There has been no fever. She reports No chest pain, no confusion, no visual disturbance, no diarrhea, no vomiting, no headaches, no sore throat, muscle weakness, no stiff neck, no speech difficulty, and no cough. Past Medical History:   Diagnosis Date    Anxiety     Depression        Past Surgical History:   Procedure Laterality Date    COLONOSCOPY N/A 8/26/2021    COLONOSCOPY   :- performed by Beckie Singh MD at [facility] ENDOSCOPY         No family history on file.     Social History     Socioeconomic History    Marital status:      Spouse name: Not on file    Number of children: Not on file    Years of education: Not on file    Highest education level: Not on file   Occupational History    Not on file   Tobacco Use    Smoking status: Some Days     Types: Cigarettes    Smokeless tobacco: Never   Substance and Sexual Activity    Alcohol use: Yes     Comment: 5 beers/wine    Drug use: Not Currently    Sexual activity: Not on file Other Topics Concern    Not on file   Social History Narrative    Not on file     Social Determinants of Health     Financial Resource Strain: Not on file   Food Insecurity: Not on file   Transportation Needs: Not on file   Physical Activity: Not on file   Stress: Not on file   Social Connections: Not on file   Intimate Partner Violence: Not on file   Housing Stability: Not on file         ALLERGIES: Compazine [prochlorperazine]    Review of Systems   Constitutional:  Negative for activity change, chills and fever. HENT:  Negative for nosebleeds, sore throat, trouble swallowing and voice change. Eyes:  Negative for visual disturbance. Respiratory:  Negative for cough and shortness of breath. Cardiovascular:  Negative for chest pain and palpitations. Gastrointestinal:  Negative for abdominal pain, constipation, diarrhea, nausea and vomiting. Genitourinary:  Negative for bladder incontinence, difficulty urinating, dysuria, hematuria and urgency. Musculoskeletal:  Negative for back pain, neck pain and neck stiffness. Skin:  Negative for color change. Allergic/Immunologic: Negative for immunocompromised state. Neurological:  Positive for seizures. Negative for dizziness, syncope, speech difficulty, weakness, light-headedness, numbness and headaches. Psychiatric/Behavioral:  Negative for behavioral problems, confusion, hallucinations, self-injury and suicidal ideas. Vitals:    04/25/23 0046   BP: (!) 147/92   Pulse: (!) 106   Resp: 20   Temp: 97.9 °F (36.6 °C)   SpO2: 100%            Physical Exam  Vitals and nursing note reviewed. Constitutional:       General: She is not in acute distress. Appearance: She is well-developed. She is not diaphoretic. HENT:      Head: Normocephalic and atraumatic. Eyes:      Pupils: Pupils are equal, round, and reactive to light. Cardiovascular:      Rate and Rhythm: Normal rate and regular rhythm. Heart sounds: Normal heart sounds.  No murmur heard.    No friction rub. No gallop. Pulmonary:      Effort: Pulmonary effort is normal. No respiratory distress. Breath sounds: Normal breath sounds. No wheezing. Abdominal:      General: Bowel sounds are normal. There is no distension. Palpations: Abdomen is soft. Tenderness: There is no abdominal tenderness. There is no guarding or rebound. Musculoskeletal:         General: Normal range of motion. Cervical back: Normal range of motion and neck supple. Skin:     General: Skin is warm. Findings: No rash. Neurological:      Mental Status: She is alert and oriented to person, place, and time. Psychiatric:         Behavior: Behavior normal.         Thought Content: Thought content normal.         Judgment: Judgment normal.        Medical Decision Making  Amount and/or Complexity of Data Reviewed  Labs: ordered. Radiology: ordered. Risk  Prescription drug management. Decision regarding hospitalization. This is a 26-year-old female with past medical history, review of systems, physical exam as above, presenting with complaints of seizure, dysphagia. Patient states she was here earlier today, for first-time seizure, and following a fall with left-sided facial injury. Patient received work-up here in emergency department, with reassuring lab work, CT imaging, discharged to follow-up with neurology. She states this evening she was awoken by her  stating that she was having another seizure, and thus EMS was summoned. She denies medical problems, states long-term alcohol abuse, started tapering her alcohol abruptly approximate 1 month ago, and states since then approximately 1-2 drinks per day, her last drink being 2 days ago. She endorses approximate 2 weeks of dysphagia, states she has resorted to eating soup, she is having difficulty swallowing, without pain. She endorses nausea, without abdominal pain.   She denies visual disturbance, focal weakness or paresthesias, ataxia. Upon arrival she is noted to be mildly hypertensive, afebrile, mildly tachycardic, satting well on room air. She is noted to have some left periorbital ecchymosis, secondary to previous fall, nonfocal neurologic exam, clear breath sounds to auscultation, regular rate and rhythm without murmurs gallops or rubs. Less likely DTs secondary to alcohol withdrawal given prolonged taper, concerning report of dysphagia. Plan to obtain CTA given normal CT earlier today, and likely admit for further care and evaluation. Procedures    Perfect Serve Consult for Admission  3:14 AM    ED Room Number: MZ84/56  Patient Name and age:  Johan Connolly 38 y.o.  female  Working Diagnosis:   1. Seizure ([address]    2.  Dysphagia, unspecified type        COVID-19 Suspicion:  no  Sepsis present:  no  Reassessment needed: no  Code Status:  Full Code  Readmission: no  Isolation Requirements:  no  Recommended Level of Care:  med/surg  Department: [facility] Adult ED - [phone]  Admitting Provider: d/w Dr. Farhad Quigley

## 2023-04-25 NOTE — PROCEDURES
[address]   EEG    Name:  Deacon Baum  MR#:  [id number]  :  1980  ACCOUNT #:  [de-identified]  DATE OF SERVICE:  2023    REQUESTING PHYSICIAN:  Angela Napier MD    HISTORY:  The patient is a 46-year-old female who is being evaluated after recent episode of seizure. DESCRIPTION:  This is an 18-channel EEG performed on an awake and drowsy patient. During wakefulness, the dominant posterior background rhythm consists of low to medium voltage rhythms in the 10-11 Hz frequency range. Superimposed faster frequencies along with muscle tension artifact was seen in the frontal areas. When the patient was clinically noted to be drowsy, there was attenuation of the background amplitudes and frequencies seen in the central region. Photic stimulation was not performed. Hyperventilation did not produce any abnormalities. EEG SUMMARY:  Normal study. CLINICAL INTERPRETATION:  This was a normal EEG during awake and drowsy states of the patient. No lateralizing or epileptiform features were noted.       Yadira Orantes MD      [id number]  D:  2023 10:16  T:  2023 13:11  JOB #:  [id number]

## 2023-04-25 NOTE — ROUTINE PROCESS
TRANSFER - OUT REPORT:    Verbal report given to [address], RN(name) on Jessica Craig  being transferred to [facility](unit) for routine progression of care       Report consisted of patients Situation, Background, Assessment and   Recommendations(SBAR). Information from the following report(s) SBAR, ED Summary, Intake/Output, MAR, and Recent Results was reviewed with the receiving nurse. Lines:   Peripheral IV 04/25/23 Right Antecubital (Active)   Site Assessment Clean, dry, & intact 04/25/23 0227   Phlebitis Assessment 0 04/25/23 0227   Infiltration Assessment 0 04/25/23 0227   Dressing Status Clean, dry, & intact 04/25/23 4124        Opportunity for questions and clarification was provided.       Patient transported with:   [employer]

## 2023-04-25 NOTE — H&P
History and Physical    Date of Service:  4/25/2023  Primary Care Provider: Britt Corley NP  Source of information: The patient and Chart review    Chief Complaint: Seizure      History of Presenting Illness:   Renita Loera is a 38 y.o. female with apmhx etoh dependence on naltrexone, and anxiety who presents with seizure, and dysphagia. She states that she has had intermittent dysphagia for solids, and liquids for the past 4 months. Today, she was at work speaking with a colleague when her speech became altered followed by loss of consciousness described as a seizure. She was evaluated in the ED, and sent hoe with nuerology follow up. This evening her  woke up, and noted that she wsa having seizure like activity. He called EMS who noted her to be post ictal, and returned her to the ED for further evaluation. In the ED,   she was tachycardic to 106. Other VSS. Labs showed  K+ 3.2,  lacti 8.1 after the first episode, and 0.7 after the second, UDS + for amphetamines, ETOH 20,. CT head was negative. EKG showed sinus tachy sherly 107. In the ED, she received tylenol                                                   REVIEW OF SYSTEMS:  A comprehensive review of systems was negative except for that written in the History of Present Illness. Past Medical History:   Diagnosis Date    Anxiety     Depression       Past Surgical History:   Procedure Laterality Date    COLONOSCOPY N/A 8/26/2021    COLONOSCOPY   :- performed by Nakul Pitts MD at [facility] ENDOSCOPY     Prior to Admission medications    Medication Sig Start Date End Date Taking? Authorizing Provider   Doxepin 6 mg tab Take 6 mg by mouth nightly. May take if awakens in middle of night.   Indications: difficulty sleeping 1/10/23   Britt Corley NP   naltrexone (DEPADE) 50 mg tablet TAKE 1 TABLET BY MOUTH EVERY DAY 1/10/23   Britt Corley NP   buPROPion XL (WELLBUTRIN XL) 300 mg XL tablet Take 1 Tablet by mouth every morning. 9/27/22   Luisa Jenkins NP   busPIRone (BUSPAR) 30 mg tablet TAKE 1 TABLET BY MOUTH TWICE A DAY 9/27/22   Luisa Jenkins NP     Allergies   Allergen Reactions    Compazine [Prochlorperazine] Nausea Only      No family history on file. Social History:  reports that she has been smoking cigarettes. She has never used smokeless tobacco. She reports current alcohol use. She reports that she does not currently use drugs. Social Determinants of Health     Tobacco Use: High Risk    Smoking Tobacco Use: Some Days    Smokeless Tobacco Use: Never    Passive Exposure: Not on file   Alcohol Use: Not on file   Financial Resource Strain: Not on file   Food Insecurity: Not on file   Transportation Needs: Not on file   Physical Activity: Not on file   Stress: Not on file   Social Connections: Not on file   Intimate Partner Violence: Not on file   Depression: Not at risk    PHQ-2 Score: 0   Housing Stability: Not on file        Medications were reconciled to the best of my ability given all available resources at the time of admission. Route is PO if not otherwise noted. Family and social history were personally reviewed, all pertinent and relevant details are outlined as above.     Objective:   Visit Vitals  BP (!) 147/92 (BP 1 Location: Left upper arm, BP Patient Position: At rest;Sitting)   Pulse (!) 106   Temp 97.9 °F (36.6 °C)   Resp 20   SpO2 100%      O2 Device: None    PHYSICAL EXAM:   General: Alert x oriented x 3, awake, no acute distress,   HEENT: PEERL, EOMI, moist mucus membranes left periorbital ecchymosis   Neck: Supple, no JVD, no meningeal signs  Chest: Clear to auscultation bilaterally   CVS: RRR, S1 S2 heard, no murmurs/rubs/gallops  Abd: Soft, non-tender, non-distended, +bowel sounds   Ext: No clubbing, no cyanosis, no edema  Neuro/Psych: Pleasant mood and affect, CN 2-12 grossly intact, sensory grossly within normal limit, Strength 5/5 in all extremities, tremor  Cap refill: Brisk, less than 3 seconds  Pulses: 2+radial pulses  Skin: Warm, dry, without rashes or lesions    Data Review:   I have independently reviewed and interpreted patient's lab and all other diagnostic data    Abnormal Labs Reviewed   CBC WITH AUTOMATED DIFF - Abnormal; Notable for the following components:       Result Value    MCH 25.4 (*)     RDW 18.1 (*)     NEUTROPHILS 85 (*)     LYMPHOCYTES 6 (*)     IMMATURE GRANULOCYTES 1 (*)     ABS. LYMPHOCYTES 0.5 (*)     ABS. IMM. GRANS. 0.1 (*)     All other components within normal limits   METABOLIC PANEL, COMPREHENSIVE - Abnormal; Notable for the following components:    Sodium 134 (*)     Glucose 114 (*)     AST (SGOT) 91 (*)     Globulin 4.2 (*)     A-G Ratio 0.9 (*)     All other components within normal limits       All Micro Results       None            IMAGING:   CTA HEAD NECK W CONT              ECG/ECHO:    Results for orders placed or performed during the hospital encounter of 04/24/23   EKG, 12 LEAD, INITIAL   Result Value Ref Range    Ventricular Rate 107 BPM    Atrial Rate 107 BPM    P-R Interval 194 ms    QRS Duration 88 ms    Q-T Interval 354 ms    QTC Calculation (Bezet) 472 ms    Calculated P Axis 1 degrees    Calculated R Axis 11 degrees    Calculated T Axis 21 degrees    Diagnosis       Sinus tachycardia  Minimal voltage criteria for LVH, may be normal variant ( [place] product )  Borderline ECG  When compared with ECG of 01-SEP-2022 05:47,  No significant change was found  Confirmed by Lucas Fierro MD ([id number]) on 4/24/2023 8:27:03 PM            Notes reviewed from all clinical/nonclinical/nursing services involved in patient's clinical care. Care coordination discussions were held with appropriate clinical/nonclinical/ nursing providers based on care coordination needs. Assessment:   Given the patient's current clinical presentation, there is a high level of concern for decompensation if discharged from the emergency department.  Complex decision making was performed, which includes reviewing the patient's available past medical records, laboratory results, and imaging studies. Active Problems:    * No active hospital problems. *      Plan:     #Seizure  #ETOH dependence  -CiWAA with prn ativan  -MVI, thiamine, folate  -seizure precautions  -mRI brain pending  -UDS  -neurology consulted  -continue naltrexone    #dysphagia  -intermittent dysphagia to solid and liquid  -consult GI    #Anxiety  -continue home buspar  -hold home wellbutrin due to decreased seizure threshold      DIET: No diet orders on file   ISOLATION PRECAUTIONS: There are currently no Active Isolations  CODE STATUS: No Order   DVT PROPHYLAXIS: SCDs  FUNCTIONAL STATUS PRIOR TO HOSPITALIZATION: Fully active and ambulatory; able to carry on all self-care without restriction. Ambulatory status/function: By self   EARLY MOBILITY ASSESSMENT: Recommend routine ambulation while hospitalized with the assistance of nursing staff  ANTICIPATED DISCHARGE: 24-48 hours. ANTICIPATED DISPOSITION: Home  EMERGENCY CONTACT/SURROGATE DECISION MAKER: spouse    Signed By: Rigoberto Camargo MD     April 25, 2023         Please note that this dictation may have been completed with Dragon, the computer voice recognition software. Quite often unanticipated grammatical, syntax, homophones, and other interpretive errors are inadvertently transcribed by the computer software. Please disregard these errors. Please excuse any errors that have escaped final proofreading.

## 2023-04-25 NOTE — PROCEDURES
[address]   EEG    Name:  Shira Gabriel  MR#:  [id number]  :  1980  ACCOUNT #:  [de-identified]  DATE OF SERVICE:  2023    REQUESTING PHYSICIAN:  Mikael Ash MD    HISTORY:  The patient is a 61-year-old female who is being evaluated after recent episode of seizure. DESCRIPTION:  This is an 18-channel EEG performed on an awake and drowsy patient. During wakefulness, the dominant posterior background rhythm consists of low to medium voltage rhythms in the 10-11 Hz frequency range. Superimposed faster frequencies along with muscle tension artifact was seen in the frontal areas. When the patient was clinically noted to be drowsy, there was attenuation of the background amplitudes and frequencies seen in the central region. Photic stimulation was not performed. Hyperventilation did not produce any abnormalities. EEG SUMMARY:  Normal study. CLINICAL INTERPRETATION:  This was a normal EEG during awake and drowsy states of the patient. No lateralizing or epileptiform features were noted.       Navin Dawson MD      [id number]  D:  2023 10:16  T:  2023 13:11  JOB #:  [id number]

## 2023-04-25 NOTE — CONSULTS
[address] [facility] Dixie rebecca NOTE  [facility]  [phone] NP in-hospital cell phone M-F until 4:30  After 5pm or on weekends, please call  for physician on call        NAME:  Jo Espinoza   :   1980   MRN:   [id number]       Referring Physician: Jasper Grewal Date: 2023 11:30 AM    Chief Complaint: intermittent dysphagia to solids and liquids     History of Present Illness:  Patient is a 38 y.o. who is seen in consultation at the request of Dr. Matthew Miguel for intermittent dysphagia to solids and liquids. Medical history as listed below. She presented for seizure activity. She notes 2-4 months of dysphagia to solids and liquids, requires regurgitation. She denies nausea or abdominal pain. +NSAID's and alcohol. Colonoscopy 2021 Dr. Jens Allen for ileitis on CT, normal    I have reviewed the emergency room note, hospital admission note, notes by all other clinicians who have seen the patient during this hospitalization to date. I have reviewed the problem list and the reason for this hospitalization. I have reviewed the allergies and the medications the patient was taking at home prior to this hospitalization. PMH:  Past Medical History:   Diagnosis Date    Anxiety     Depression        PSH:  Past Surgical History:   Procedure Laterality Date    COLONOSCOPY N/A 2021    COLONOSCOPY   :- performed by Gin Mchugh MD at [facility] ENDOSCOPY       Allergies: Allergies   Allergen Reactions    Compazine [Prochlorperazine] Nausea Only       Home Medications:  Prior to Admission Medications   Prescriptions Last Dose Informant Patient Reported? Taking? Doxepin 6 mg tab   No No   Sig: Take 6 mg by mouth nightly. May take if awakens in middle of night.   Indications: difficulty sleeping   buPROPion XL (WELLBUTRIN XL) 300 mg XL tablet   No No   Sig: Take 1 Tablet by mouth every morning. Patient taking differently: Take 1 Tablet by mouth daily. busPIRone (BUSPAR) 30 mg tablet   No No   Sig: TAKE 1 TABLET BY MOUTH TWICE A DAY   naltrexone (DEPADE) 50 mg tablet   No No   Sig: TAKE 1 TABLET BY MOUTH EVERY DAY      Facility-Administered Medications: None       Hospital Medications:  Current Facility-Administered Medications   Medication Dose Route Frequency    sodium chloride (NS) flush 5-40 mL  5-40 mL IntraVENous Q8H    sodium chloride (NS) flush 5-40 mL  5-40 mL IntraVENous PRN    LORazepam (ATIVAN) 2 mg/mL injection 2 mg  2 mg IntraVENous Q5MIN PRN    [Held by provider] folic acid (FOLVITE) tablet 1 mg  1 mg Oral DAILY    [Held by provider] thiamine HCL (B-1) tablet 100 mg  100 mg Oral DAILY    multivitamin, tx-iron-ca-min (THERA-M w/ IRON) tablet 1 Tablet  1 Tablet Oral DAILY    dextrose 5% - 0.9% NaCl with KCl 20 mEq/L infusion  125 mL/hr IntraVENous CONTINUOUS    0.9% sodium chloride 1,000 mL with thiamine 098 mg, folic acid 1 mg infusion   IntraVENous Q24H    [Held by provider] naltrexone (DEPADE) tablet 50 mg  50 mg Oral DAILY    [Held by provider] busPIRone (BUSPAR) tablet 30 mg  30 mg Oral BID    diazePAM (VALIUM) injection 10 mg  10 mg IntraVENous Q1H PRN    diazePAM (VALIUM) injection 20 mg  20 mg IntraVENous Q1H PRN    . PHARMACY TO SUBSTITUTE PER PROTOCOL (Reordered from: buPROPion XL (WELLBUTRIN XL) 300 mg XL tablet)    Per Protocol    pantoprazole (PROTONIX) 40 mg in 0.9% sodium chloride 10 mL injection  40 mg IntraVENous Q12H     Current Outpatient Medications   Medication Sig    Doxepin 6 mg tab Take 6 mg by mouth nightly. May take if awakens in middle of night. Indications: difficulty sleeping    naltrexone (DEPADE) 50 mg tablet TAKE 1 TABLET BY MOUTH EVERY DAY    buPROPion XL (WELLBUTRIN XL) 300 mg XL tablet Take 1 Tablet by mouth every morning.  (Patient taking differently: Take 1 Tablet by mouth daily.)    busPIRone (BUSPAR) 30 mg tablet TAKE 1 TABLET BY MOUTH TWICE A DAY       Social History:  Social History     Tobacco Use    Smoking status: Some Days     Types: Cigarettes    Smokeless tobacco: Never   Substance Use Topics    Alcohol use: Yes     Comment: 5 beers/wine       Family History:  No family history on file. Review of Systems:  Constitutional: negative fever, negative chills, negative weight loss  Eyes:   negative visual changes  ENT:   negative sore throat, tongue or lip swelling  Respiratory:  negative cough, negative dyspnea  Cards:  negative for chest pain, palpitations, lower extremity edema  GI:   See HPI  :  negative for frequency, dysuria  Integument:  negative for rash and pruritus  Heme:  negative for easy bruising and gum/nose bleeding  Musculoskeletal:negative for myalgias, back pain and muscle weakness  Neuro:    + for headaches, dizziness, vertigo  Psych: negative for feelings of anxiety, depression     Objective:   Patient Vitals for the past 8 hrs:   BP Temp Pulse Resp SpO2   04/25/23 1003 (!) 132/97 -- 95 27 --   04/25/23 0900 (!) 128/101 98.3 °F (36.8 °C) 88 20 99 %     No intake/output data recorded. No intake/output data recorded.     EXAM:     CONST:  Pleasant lady lying in bed, jittery   NEURO:  alert and oriented x 3, word finding difficulty    HEENT: EOMI, no scleral icterus   LUNGS: No increased WOB   CARD:   tachycardia   ABD:  soft, no tenderness, no rebound, no masses, non distended   EXT:  no edema, warm   PSYCH: full, not anxious     Data Review     Recent Labs     04/25/23  0105 04/24/23  1655   WBC 8.3 6.4   HGB 11.8 11.6   HCT 37.9 37.0    322     Recent Labs     04/25/23  0105 04/24/23  1655   * 134*   K 3.7 3.2*    99   CO2 23 21   BUN 9 9   CREA 0.69 0.73   * 144*   CA 8.9 9.6     Recent Labs     04/25/23  0105 04/24/23  1655   AP 57 60   TP 8.0 8.2   ALB 3.8 3.9   GLOB 4.2* 4.3*     Recent Labs     04/24/23  1655   INR 1.0   PTP 10.7          Assessment:     Dysphagia x months to solids and liquids, requiring regurgitation   Seizures  Alcohol withdrawal      Patient Active Problem List   Diagnosis Code    Intussusception intestine ([facility] [address] K56.1    Bipolar depression ([address] F31.9    Mild alcohol use disorder, in sustained remission F10.11    IUD (intrauterine device) in place Z97.5    Difficulty concentrating R41.840    Moderate mixed hyperlipidemia not requiring statin therapy E78.2    Seizure ([facility] [address] R56.9     Plan:       NPO  BID PPI  UGI series  Consider EGD next once cleared by neurology   Patient discussed with Dr. Rossy Sher  Thank you for allowing me to participate in care of Prateek Rose     Signed By: Verna Zuniga NP     4/25/2023  11:30 AM

## 2023-04-25 NOTE — H&P
History and Physical    Date of Service:  4/25/2023  Primary Care Provider: Alejandro Dean NP  Source of information: The patient and Chart review    Chief Complaint: Seizure      History of Presenting Illness:   Phil Corona is a 38 y.o. female with apmhx etoh dependence on naltrexone, and anxiety who presents with seizure, and dysphagia. She states that she has had intermittent dysphagia for solids, and liquids for the past 4 months. Today, she was at work speaking with a colleague when her speech became altered followed by loss of consciousness described as a seizure. She was evaluated in the ED, and sent hoe with nuerology follow up. This evening her  woke up, and noted that she wsa having seizure like activity. He called EMS who noted her to be post ictal, and returned her to the ED for further evaluation. In the ED,   she was tachycardic to 106. Other VSS. Labs showed  K+ 3.2,  lacti 8.1 after the first episode, and 0.7 after the second, UDS + for amphetamines, ETOH 20,. CT head was negative. EKG showed sinus tachy sherly 107. In the ED, she received tylenol                                                   REVIEW OF SYSTEMS:  A comprehensive review of systems was negative except for that written in the History of Present Illness. Past Medical History:   Diagnosis Date    Anxiety     Depression       Past Surgical History:   Procedure Laterality Date    COLONOSCOPY N/A 8/26/2021    COLONOSCOPY   :- performed by Jim Dennis MD at [facility] ENDOSCOPY     Prior to Admission medications    Medication Sig Start Date End Date Taking? Authorizing Provider   Doxepin 6 mg tab Take 6 mg by mouth nightly. May take if awakens in middle of night.   Indications: difficulty sleeping 1/10/23   Alejandro Dean NP   naltrexone (DEPADE) 50 mg tablet TAKE 1 TABLET BY MOUTH EVERY DAY 1/10/23   Alejandro Dean NP   buPROPion XL (WELLBUTRIN XL) 300 mg XL tablet Take 1 Tablet by mouth every morning. 9/27/22   Chito Guzmán NP   busPIRone (BUSPAR) 30 mg tablet TAKE 1 TABLET BY MOUTH TWICE A DAY 9/27/22   Chito Guzmán NP     Allergies   Allergen Reactions    Compazine [Prochlorperazine] Nausea Only      No family history on file. Social History:  reports that she has been smoking cigarettes. She has never used smokeless tobacco. She reports current alcohol use. She reports that she does not currently use drugs. Social Determinants of Health     Tobacco Use: High Risk    Smoking Tobacco Use: Some Days    Smokeless Tobacco Use: Never    Passive Exposure: Not on file   Alcohol Use: Not on file   Financial Resource Strain: Not on file   Food Insecurity: Not on file   Transportation Needs: Not on file   Physical Activity: Not on file   Stress: Not on file   Social Connections: Not on file   Intimate Partner Violence: Not on file   Depression: Not at risk    PHQ-2 Score: 0   Housing Stability: Not on file        Medications were reconciled to the best of my ability given all available resources at the time of admission. Route is PO if not otherwise noted. Family and social history were personally reviewed, all pertinent and relevant details are outlined as above.     Objective:   Visit Vitals  BP (!) 147/92 (BP 1 Location: Left upper arm, BP Patient Position: At rest;Sitting)   Pulse (!) 106   Temp 97.9 °F (36.6 °C)   Resp 20   SpO2 100%      O2 Device: None    PHYSICAL EXAM:   General: Alert x oriented x 3, awake, no acute distress,   HEENT: PEERL, EOMI, moist mucus membranes left periorbital ecchymosis   Neck: Supple, no JVD, no meningeal signs  Chest: Clear to auscultation bilaterally   CVS: RRR, S1 S2 heard, no murmurs/rubs/gallops  Abd: Soft, non-tender, non-distended, +bowel sounds   Ext: No clubbing, no cyanosis, no edema  Neuro/Psych: Pleasant mood and affect, CN 2-12 grossly intact, sensory grossly within normal limit, Strength 5/5 in all extremities, tremor  Cap refill: Brisk, less than 3 seconds  Pulses: 2+radial pulses  Skin: Warm, dry, without rashes or lesions    Data Review:   I have independently reviewed and interpreted patient's lab and all other diagnostic data    Abnormal Labs Reviewed   CBC WITH AUTOMATED DIFF - Abnormal; Notable for the following components:       Result Value    MCH 25.4 (*)     RDW 18.1 (*)     NEUTROPHILS 85 (*)     LYMPHOCYTES 6 (*)     IMMATURE GRANULOCYTES 1 (*)     ABS. LYMPHOCYTES 0.5 (*)     ABS. IMM. GRANS. 0.1 (*)     All other components within normal limits   METABOLIC PANEL, COMPREHENSIVE - Abnormal; Notable for the following components:    Sodium 134 (*)     Glucose 114 (*)     AST (SGOT) 91 (*)     Globulin 4.2 (*)     A-G Ratio 0.9 (*)     All other components within normal limits       All Micro Results       None            IMAGING:   CTA HEAD NECK W CONT              ECG/ECHO:    Results for orders placed or performed during the hospital encounter of 04/24/23   EKG, 12 LEAD, INITIAL   Result Value Ref Range    Ventricular Rate 107 BPM    Atrial Rate 107 BPM    P-R Interval 194 ms    QRS Duration 88 ms    Q-T Interval 354 ms    QTC Calculation (Bezet) 472 ms    Calculated P Axis 1 degrees    Calculated R Axis 11 degrees    Calculated T Axis 21 degrees    Diagnosis       Sinus tachycardia  Minimal voltage criteria for LVH, may be normal variant ( [place] product )  Borderline ECG  When compared with ECG of 01-SEP-2022 05:47,  No significant change was found  Confirmed by Suzanne Pepe MD ([id number]) on 4/24/2023 8:27:03 PM            Notes reviewed from all clinical/nonclinical/nursing services involved in patient's clinical care. Care coordination discussions were held with appropriate clinical/nonclinical/ nursing providers based on care coordination needs. Assessment:   Given the patient's current clinical presentation, there is a high level of concern for decompensation if discharged from the emergency department.  Complex decision making was performed, which includes reviewing the patient's available past medical records, laboratory results, and imaging studies. Active Problems:    * No active hospital problems. *      Plan:     #Seizure  #ETOH dependence  -CiWAA with prn ativan  -MVI, thiamine, folate  -seizure precautions  -mRI brain pending  -UDS  -neurology consulted  -continue naltrexone    #dysphagia  -intermittent dysphagia to solid and liquid  -consult GI    #Anxiety  -continue home buspar  -hold home wellbutrin due to decreased seizure threshold      DIET: No diet orders on file   ISOLATION PRECAUTIONS: There are currently no Active Isolations  CODE STATUS: No Order   DVT PROPHYLAXIS: SCDs  FUNCTIONAL STATUS PRIOR TO HOSPITALIZATION: Fully active and ambulatory; able to carry on all self-care without restriction. Ambulatory status/function: By self   EARLY MOBILITY ASSESSMENT: Recommend routine ambulation while hospitalized with the assistance of nursing staff  ANTICIPATED DISCHARGE: 24-48 hours. ANTICIPATED DISPOSITION: Home  EMERGENCY CONTACT/SURROGATE DECISION MAKER: spouse    Signed By: Calli Rodrigues MD     April 25, 2023         Please note that this dictation may have been completed with Dragon, the computer voice recognition software. Quite often unanticipated grammatical, syntax, homophones, and other interpretive errors are inadvertently transcribed by the computer software. Please disregard these errors. Please excuse any errors that have escaped final proofreading.

## 2023-04-25 NOTE — ED TRIAGE NOTES
Patient in through ems with complaints of seizure. Patient was seen here at [facility] ed for same a few hrs earlier. She has no hx of seizures and was ultimately dc'd home. Tonight her  woke up in bed to her having a full body seizure that lasted a minute.  EMS arrived and reported she was postdictal.

## 2023-04-25 NOTE — PROGRESS NOTES
Bedside and Verbal shift change report given to [address] (oncoming nurse) by [facility] (offgoing nurse). Report included the following information SBAR, Kardex, Intake/Output, MAR, Recent Results, and Cardiac Rhythm NSR .

## 2023-04-26 VITALS
HEART RATE: 100 BPM | TEMPERATURE: 98.3 F | OXYGEN SATURATION: 100 % | DIASTOLIC BLOOD PRESSURE: 74 MMHG | SYSTOLIC BLOOD PRESSURE: 110 MMHG | RESPIRATION RATE: 20 BRPM

## 2023-04-26 PROCEDURE — 99223 1ST HOSP IP/OBS HIGH 75: CPT | Performed by: PSYCHIATRY & NEUROLOGY

## 2023-04-26 PROCEDURE — G0378 HOSPITAL OBSERVATION PER HR: HCPCS

## 2023-04-26 PROCEDURE — 96376 TX/PRO/DX INJ SAME DRUG ADON: CPT

## 2023-04-26 PROCEDURE — 74011250636 HC RX REV CODE- 250/636: Performed by: HOSPITALIST

## 2023-04-26 PROCEDURE — C9113 INJ PANTOPRAZOLE SODIUM, VIA: HCPCS | Performed by: HOSPITALIST

## 2023-04-26 PROCEDURE — 74011000250 HC RX REV CODE- 250: Performed by: HOSPITALIST

## 2023-04-26 RX ORDER — LANOLIN ALCOHOL/MO/W.PET/CERES
100 CREAM (GRAM) TOPICAL DAILY
Qty: 30 TABLET | Refills: 0 | Status: SHIPPED | OUTPATIENT
Start: 2023-04-26 | End: 2023-05-26

## 2023-04-26 RX ORDER — FOLIC ACID 1 MG/1
1 TABLET ORAL DAILY
Qty: 30 TABLET | Refills: 0 | Status: SHIPPED | OUTPATIENT
Start: 2023-04-26 | End: 2023-05-26

## 2023-04-26 RX ORDER — PANTOPRAZOLE SODIUM 40 MG/1
40 FOR SUSPENSION ORAL
Qty: 60 EACH | Refills: 0 | Status: SHIPPED | OUTPATIENT
Start: 2023-04-26 | End: 2023-05-26

## 2023-04-26 RX ADMIN — SODIUM CHLORIDE, PRESERVATIVE FREE 40 MG: 5 INJECTION INTRAVENOUS at 09:05

## 2023-04-26 RX ADMIN — FOLIC ACID: 5 INJECTION, SOLUTION INTRAMUSCULAR; INTRAVENOUS; SUBCUTANEOUS at 09:05

## 2023-04-26 NOTE — PROGRESS NOTES
Problem: Falls - Risk of  Goal: *Absence of Falls  Description: Document Rachael Corona Fall Risk and appropriate interventions in the flowsheet.   Outcome: Progressing Towards Goal  Note: Fall Risk Interventions:                                Problem: Seizure Disorder (Adult)  Goal: *STG: Remains free of seizure activity  Outcome: Progressing Towards Goal  Goal: *STG: Maintains lab values within therapeutic range  Outcome: Progressing Towards Goal  Goal: *STG/LTG: Complies with medication therapy  Outcome: Progressing Towards Goal

## 2023-04-26 NOTE — DISCHARGE INSTRUCTIONS
Discharge Instructions       PATIENT ID: Radha Harper  MRN: [id number]   YOB: 1980    DATE OF ADMISSION: 4/25/2023   DATE OF DISCHARGE: 4/26/2023    PRIMARY CARE PROVIDER: Mike Espino     ATTENDING PHYSICIAN: Paul Matt MD   DISCHARGING PROVIDER: Lico Virk MD    To contact this individual call [phone] and ask the  to page. If unavailable ask to be transferred the Adult Hospitalist Department. DISCHARGE DIAGNOSES   Seizure. You have been admitted observation, no recurrence of seizures in the hospital.  You were evaluated by neurologist.  The seizures are suspected to be due to alcohol withdrawal related as such no antiepileptic medication recommended at this time but recommended outpatient follow-up with neurology and a prolonged ambulatory EEG. Continue to exercise seizure precautions including no driving until you are seizure-free for 6 months. Wellbutrin is known to lower seizure threshold thus making you vulnerable for seizures therefore we recommend you stop taking this medicine. Discussed with psychiatry team here who said they will see you tomorrow however you preferred to go and follow up as outpatient instead of waiting overnight to been by psychiatrist inpatient. Please see and discuss with your PCP/psychiatrist as soon after discharge as possible for alternate medications. We recommend you quit drinking alcohol. Continue with the naltrexone as before. Moderately impaired esophageal motility. Moderately severe gastroesophageal reflux. You were evaluated by gastroenterologist who recommend outpatient follow-up for further evaluation including EGD. You are prescribed Protonix 40 mg twice daily for a month, you can then switch to Protonix 40 mg daily. A prescription for one month supply sent to your pharmacy, make sure you get refill from your PCP.   Please call the GI office to schedule an appointment, their contact is listed below. Anxiety and depression you have denied ever having symptoms of suicidal or homicidal ideation. We recommend you stop the Wellbutrin as indicated above. CONSULTATIONS: Neurologist, gastroenterologist    PROCEDURES/SURGERIES: * No surgery found *    PENDING TEST RESULTS:   At the time of discharge the following test results are still pending: none    FOLLOW UP APPOINTMENTS:   PCP  Gastroenterologist    ADDITIONAL CARE RECOMMENDATIONS:     DIET: Regular soft Diet    ACTIVITY: Activity as tolerated    WOUND CARE: NA    EQUIPMENT needed: NA      DISCHARGE MEDICATIONS:   See Medication Reconciliation Form    It is important that you take the medication exactly as they are prescribed. Keep your medication in the bottles provided by the pharmacist and keep a list of the medication names, dosages, and times to be taken in your wallet. Do not take other medications without consulting your doctor. NOTIFY YOUR PHYSICIAN FOR ANY OF THE FOLLOWING:   Fever over 101 degrees for 24 hours. Chest pain, shortness of breath, fever, chills, nausea, vomiting, diarrhea, change in mentation, falling, weakness, bleeding. Severe pain or pain not relieved by medications. Or, any other signs or symptoms that you may have questions about. DISPOSITION:  x  Home With:   OT  PT  HH  RN       SNF/Inpatient Rehab/LTAC    Independent/assisted living    Hospice    Other:           Signed:    Mike Lincoln MD  4/26/2023  3:55 PM

## 2023-04-26 NOTE — PROGRESS NOTES
Problem: Falls - Risk of  Goal: *Absence of Falls  Description: Document Sedrick Razaing Fall Risk and appropriate interventions in the flowsheet.   Outcome: Progressing Towards Goal  Note: Fall Risk Interventions:

## 2023-04-26 NOTE — CONSULTS
[address]    Name:  Alexa Gaitan  MR#:  [id number]  :  1980  ACCOUNT #:  [de-identified]  DATE OF SERVICE:  2023      REQUESTING PHYSICIAN:   Travon Orta MD    REASON FOR EVALUATION:  Seizure. HISTORY OF PRESENT ILLNESS:  The patient is a 53-year-old female with history of intermittent alcohol abuse, anxiety, depression, who has been on BuSpar and Wellbutrin for many years. She says that she had not had much alcohol in a few months, but this past Saturday, she had some urge to drink and had a couple of beers along with 6 ounces of whiskey. Then, the following day she had a beer. The next day, Monday, she was at work. She walked over to her co-worker's cabin and was talking nonsensical.  Then, she abruptly collapsed to the floor and went into a convulsion that lasted a minute or so. Paramedics were called and she was brought into the hospital.  She sustained a black eye from the fall. She had CT brain and basic labs and was discharged home. Within about 8 hours or so, she ended up having another seizure that was witnessed by her . She was foaming at the mouth and was having convulsions in the extremities that lasted a couple of minutes and then she became unresponsive. She was brought back to the hospital and readmitted. She has been having difficulty with swallowing and drinking her liquid/adequate hydration, and this is being worked up by Gastroenterology. Over the past couple of weeks, she has also had instances where she had involuntary spasms of both upper extremities one time and then only the right upper extremity while driving one day. Denies any headache, changes in vision or focal motor sensory deficits. There is no family history of epilepsy. PAST MEDICAL HISTORY:  As mentioned above. HOME MEDICATIONS:  1. Doxepin at bedtime. 2.  Wellbutrin 300 mg daily in the morning. 3. BuSpar 30 mg twice a day.     ALLERGIES: COMPAZINE. FAMILY HISTORY:  Negative. SOCIAL HISTORY:  She has been smoking cigarettes. Drinks alcohol off and on, but states that she has recently done much better than past.    PHYSICAL EXAMINATION:  GENERAL:  The patient is alert, fully oriented. VITAL SIGNS:  Blood pressure 122/88, temperature 98.2, pulse is 86. NEUROLOGIC:  Speech is clear. Comprehension is normal.  Pupils are equal, round, reactive. Extraocular movements are full. Face is symmetric. Tongue is midline. Hearing is baseline. Muscle tone and bulk normal.  Strength normal in both upper and lower extremities. DTRs 2/2 and symmetric. Toes downgoing. Sensation intact. Gait baseline. HEART:  Regular rate and rhythm. CHEST:  Clear. ABDOMEN:  Soft, nontender. Positive bowel sounds. EXTREMITIES:  No edema. LABORATORY DATA:  CBC is unremarkable. Chemistry, sodium 134, potassium 3.7, glucose 114, BUN 9, creatinine 0.69. Lipid profile with total cholesterol 242, . Urine drug screen positive for amphetamines. DIAGNOSTIC DATA:  MRI scan of the brain did not show any abnormalities, minimal nonspecific white matter changes were seen. EEG was normal.    ASSESSMENT AND PLAN:  A 46-year-old female with history of intermittent alcohol abuse in the past, anxiety and depression. She had a couple of beers and about 6 ounces of whiskey this past Saturday night after a significant gap. She may have had one beer on Sunday. On Monday, she was at work when she was witnessed to have a generalized seizure by a co-worker. Came to emergency department, had basic workup and was discharged. Within about 8 hours, she ended up having another seizure witnessed by her . She was readmitted for further workup. MRI brain and EEG came back normal.  She reports that she has had some involuntary muscle spasms in her hands off and on over the past few weeks.     I suspect that this was alcohol withdrawal-related seizure with Wellbutrin lowering the seizure threshold. We will defer starting any anticonvulsant medications but would recommend outpatient followup with Neurology and prolonged ambulatory EEG. Continue to exercise seizure precautions including no driving for 6 months. Okay to discharge from a neurological standpoint. All questions were answered. Thank you for this consultation.       Greyson Wilson MD      [id number]  D:  04/26/2023 11:59  T:  04/26/2023 12:51  JOB #:  [id number]

## 2023-04-26 NOTE — PROGRESS NOTES
Problem: Falls - Risk of  Goal: *Absence of Falls  Description: Document Matthew Virk Fall Risk and appropriate interventions in the flowsheet.   Outcome: Progressing Towards Goal  Note: Fall Risk Interventions: None

## 2023-04-26 NOTE — DISCHARGE INSTRUCTIONS
Discharge Instructions       PATIENT ID: Alondra Grossman  MRN: [id number]   YOB: 1980    DATE OF ADMISSION: 4/25/2023   DATE OF DISCHARGE: 4/26/2023    PRIMARY CARE PROVIDER: Brody Dawn     ATTENDING PHYSICIAN: Negra Alba MD   DISCHARGING PROVIDER: Antonella Tenorio MD    To contact this individual call [phone] and ask the  to page. If unavailable ask to be transferred the Adult Hospitalist Department. DISCHARGE DIAGNOSES   Seizure. You have been admitted observation, no recurrence of seizures in the hospital.  You were evaluated by neurologist.  The seizures are suspected to be due to alcohol withdrawal related as such no antiepileptic medication recommended at this time but recommended outpatient follow-up with neurology and a prolonged ambulatory EEG. Continue to exercise seizure precautions including no driving until you are seizure-free for 6 months. Wellbutrin is known to lower seizure threshold thus making you vulnerable for seizures therefore we recommend you stop taking this medicine. Discussed with psychiatry team here who said they will see you tomorrow however you preferred to go and follow up as outpatient instead of waiting overnight to been by psychiatrist inpatient. Please see and discuss with your PCP/psychiatrist as soon after discharge as possible for alternate medications. We recommend you quit drinking alcohol. Continue with the naltrexone as before. Moderately impaired esophageal motility. Moderately severe gastroesophageal reflux. You were evaluated by gastroenterologist who recommend outpatient follow-up for further evaluation including EGD. You are prescribed Protonix 40 mg twice daily for a month, you can then switch to Protonix 40 mg daily. A prescription for one month supply sent to your pharmacy, make sure you get refill from your PCP.   Please call the GI office to schedule an appointment, their contact is listed below. Anxiety and depression you have denied ever having symptoms of suicidal or homicidal ideation. We recommend you stop the Wellbutrin as indicated above. CONSULTATIONS: Neurologist, gastroenterologist    PROCEDURES/SURGERIES: * No surgery found *    PENDING TEST RESULTS:   At the time of discharge the following test results are still pending: none    FOLLOW UP APPOINTMENTS:   PCP  Gastroenterologist    ADDITIONAL CARE RECOMMENDATIONS:     DIET: Regular soft Diet    ACTIVITY: Activity as tolerated    WOUND CARE: NA    EQUIPMENT needed: NA      DISCHARGE MEDICATIONS:   See Medication Reconciliation Form    It is important that you take the medication exactly as they are prescribed. Keep your medication in the bottles provided by the pharmacist and keep a list of the medication names, dosages, and times to be taken in your wallet. Do not take other medications without consulting your doctor. NOTIFY YOUR PHYSICIAN FOR ANY OF THE FOLLOWING:   Fever over 101 degrees for 24 hours. Chest pain, shortness of breath, fever, chills, nausea, vomiting, diarrhea, change in mentation, falling, weakness, bleeding. Severe pain or pain not relieved by medications. Or, any other signs or symptoms that you may have questions about. DISPOSITION:  x  Home With:   OT  PT  HH  RN       SNF/Inpatient Rehab/LTAC    Independent/assisted living    Hospice    Other:           Signed:    Payam Nam MD  4/26/2023  3:55 PM

## 2023-04-26 NOTE — PROGRESS NOTES
Problem: Falls - Risk of  Goal: *Absence of Falls  Description: Document Asia Tay Fall Risk and appropriate interventions in the flowsheet.   Outcome: Progressing Towards Goal  Note: Fall Risk Interventions:                                Problem: Seizure Disorder (Adult)  Goal: *STG: Remains free of seizure activity  Outcome: Progressing Towards Goal  Goal: *STG: Maintains lab values within therapeutic range  Outcome: Progressing Towards Goal  Goal: *STG/LTG: Complies with medication therapy  Outcome: Progressing Towards Goal

## 2023-04-26 NOTE — CONSULTS
Consult dictated. 46-year-old female with history of intermittent alcohol abuse in the past, anxiety, depression who had a couple of beers and about 6 ounces of whiskey Saturday night after a significant gap. She may have had 1 beer on Sunday. On Monday she was at work when she was witnessed to have a generalized seizure by a coworker. Came to ED, had basic work-up and discharge. Within about 8 hours she ended up having another seizure witnessed by her . Was readmitted. MRI brain and EEG is negative. She has had some involuntary muscle spasms in her hands off and on over the past few weeks. I suspect that this was alcohol withdrawal related seizure with Wellbutrin lowering the seizure threshold. Will defer starting any anticonvulsant medications but would recommend outpatient follow-up with neurology and a prolonged ambulatory EEG. Continue to exercise seizure precautions including no driving for 6 months. Okay to discharge from a neuro standpoint.   Dayna Elaine MD

## 2023-04-26 NOTE — CONSULTS
[address]    Name:  Jones Tavares  MR#:  [id number]  :  1980  ACCOUNT #:  [de-identified]  DATE OF SERVICE:  2023      REQUESTING PHYSICIAN:   Rubio Gutierrez MD    REASON FOR EVALUATION:  Seizure. HISTORY OF PRESENT ILLNESS:  The patient is a 46-year-old female with history of intermittent alcohol abuse, anxiety, depression, who has been on BuSpar and Wellbutrin for many years. She says that she had not had much alcohol in a few months, but this past Saturday, she had some urge to drink and had a couple of beers along with 6 ounces of whiskey. Then, the following day she had a beer. The next day, Monday, she was at work. She walked over to her co-worker's cabin and was talking nonsensical.  Then, she abruptly collapsed to the floor and went into a convulsion that lasted a minute or so. Paramedics were called and she was brought into the hospital.  She sustained a black eye from the fall. She had CT brain and basic labs and was discharged home. Within about 8 hours or so, she ended up having another seizure that was witnessed by her . She was foaming at the mouth and was having convulsions in the extremities that lasted a couple of minutes and then she became unresponsive. She was brought back to the hospital and readmitted. She has been having difficulty with swallowing and drinking her liquid/adequate hydration, and this is being worked up by Gastroenterology. Over the past couple of weeks, she has also had instances where she had involuntary spasms of both upper extremities one time and then only the right upper extremity while driving one day. Denies any headache, changes in vision or focal motor sensory deficits. There is no family history of epilepsy. PAST MEDICAL HISTORY:  As mentioned above. HOME MEDICATIONS:  1. Doxepin at bedtime. 2.  Wellbutrin 300 mg daily in the morning. 3. BuSpar 30 mg twice a day.     ALLERGIES: COMPAZINE. FAMILY HISTORY:  Negative. SOCIAL HISTORY:  She has been smoking cigarettes. Drinks alcohol off and on, but states that she has recently done much better than past.    PHYSICAL EXAMINATION:  GENERAL:  The patient is alert, fully oriented. VITAL SIGNS:  Blood pressure 122/88, temperature 98.2, pulse is 86. NEUROLOGIC:  Speech is clear. Comprehension is normal.  Pupils are equal, round, reactive. Extraocular movements are full. Face is symmetric. Tongue is midline. Hearing is baseline. Muscle tone and bulk normal.  Strength normal in both upper and lower extremities. DTRs 2/2 and symmetric. Toes downgoing. Sensation intact. Gait baseline. HEART:  Regular rate and rhythm. CHEST:  Clear. ABDOMEN:  Soft, nontender. Positive bowel sounds. EXTREMITIES:  No edema. LABORATORY DATA:  CBC is unremarkable. Chemistry, sodium 134, potassium 3.7, glucose 114, BUN 9, creatinine 0.69. Lipid profile with total cholesterol 242, . Urine drug screen positive for amphetamines. DIAGNOSTIC DATA:  MRI scan of the brain did not show any abnormalities, minimal nonspecific white matter changes were seen. EEG was normal.    ASSESSMENT AND PLAN:  A 46-year-old female with history of intermittent alcohol abuse in the past, anxiety and depression. She had a couple of beers and about 6 ounces of whiskey this past Saturday night after a significant gap. She may have had one beer on Sunday. On Monday, she was at work when she was witnessed to have a generalized seizure by a co-worker. Came to emergency department, had basic workup and was discharged. Within about 8 hours, she ended up having another seizure witnessed by her . She was readmitted for further workup. MRI brain and EEG came back normal.  She reports that she has had some involuntary muscle spasms in her hands off and on over the past few weeks.     I suspect that this was alcohol withdrawal-related seizure with Wellbutrin lowering the seizure threshold. We will defer starting any anticonvulsant medications but would recommend outpatient followup with Neurology and prolonged ambulatory EEG. Continue to exercise seizure precautions including no driving for 6 months. Okay to discharge from a neurological standpoint. All questions were answered. Thank you for this consultation.       Stephanie Us MD      [id number]  D:  04/26/2023 11:59  T:  04/26/2023 12:51  JOB #:  [id number] negative...

## 2023-04-26 NOTE — PROGRESS NOTES
[address] [facility], [address]       GI PROGRESS NOTE  Nelida Ventura, [facility] office  [phone] NP in-hospital cell phone M-F until 4:30  After 5pm or on weekends, please call  for physician on call      NAME: Sarah Cole   :  1980   MRN:  [id number]       Subjective:   No acute events.  at bedside    Objective:     VITALS:   Last 24hrs VS reviewed since prior progress note. Most recent are:  Visit Vitals  /88 (BP 1 Location: Left upper arm, BP Patient Position: Sitting)   Pulse 93   Temp 98.2 °F (36.8 °C)   Resp 21   SpO2 100%       PHYSICAL EXAM:  General: Cooperative, no acute distress    Neurologic:  Alert and oriented X 3. HEENT: EOMI, no scleral icterus   Lungs:  No increased WOB  Heart:  regular rate  Abdomen: Soft, non-distended, no tenderness. Extremities: warm  Psych:   Good insight. Not anxious or agitated. Lab Data Reviewed:     Recent Results (from the past 24 hour(s))   LACTIC ACID    Collection Time: 23  1:38 PM   Result Value Ref Range    Lactic acid 1.4 0.4 - 2.0 MMOL/L            Assessment:     Dysphagia x months to solids and liquids, requiring regurgitation; UGI Moderately impaired esophageal motility. Moderately severe gastroesophageal reflux.    Seizures  Alcohol withdrawal      Patient Active Problem List   Diagnosis Code    Intussusception intestine ([address] K56.1    Bipolar depression ([address] F31.9    Mild alcohol use disorder, in sustained remission F10.11    IUD (intrauterine device) in place Z97.5    Difficulty concentrating R41.840    Moderate mixed hyperlipidemia not requiring statin therapy E78.2    Seizure ([address] R56.9     Plan:     Advance diet as tolerated   BID PPI  No indication for EGD at this time  Will sign off, outpatient follow-up     Signed By: Chirag Vila NP     2023  12:13 PM

## 2023-04-26 NOTE — PROGRESS NOTES
[address]  [address] [facility], [address]       GI PROGRESS NOTE  Melissa Parker, [facility] office  [phone] NP in-hospital cell phone M-F until 4:30  After 5pm or on weekends, please call  for physician on call      NAME: Linda Mustafa   :  1980   MRN:  [id number]       Subjective:   No acute events.  at bedside    Objective:     VITALS:   Last 24hrs VS reviewed since prior progress note. Most recent are:  Visit Vitals  /88 (BP 1 Location: Left upper arm, BP Patient Position: Sitting)   Pulse 93   Temp 98.2 °F (36.8 °C)   Resp 21   SpO2 100%       PHYSICAL EXAM:  General: Cooperative, no acute distress    Neurologic:  Alert and oriented X 3. HEENT: EOMI, no scleral icterus   Lungs:  No increased WOB  Heart:  regular rate  Abdomen: Soft, non-distended, no tenderness. Extremities: warm  Psych:   Good insight. Not anxious or agitated. Lab Data Reviewed:     Recent Results (from the past 24 hour(s))   LACTIC ACID    Collection Time: 23  1:38 PM   Result Value Ref Range    Lactic acid 1.4 0.4 - 2.0 MMOL/L            Assessment:     Dysphagia x months to solids and liquids, requiring regurgitation; UGI Moderately impaired esophageal motility. Moderately severe gastroesophageal reflux.    Seizures  Alcohol withdrawal      Patient Active Problem List   Diagnosis Code    Intussusception intestine ([address] K56.1    Bipolar depression ([address] F31.9    Mild alcohol use disorder, in sustained remission F10.11    IUD (intrauterine device) in place Z97.5    Difficulty concentrating R41.840    Moderate mixed hyperlipidemia not requiring statin therapy E78.2    Seizure ([address] R56.9     Plan:     Advance diet as tolerated   BID PPI  No indication for EGD at this time  Will sign off, outpatient follow-up     Signed By: Huey Roger NP     2023  12:13 PM

## 2023-04-26 NOTE — CONSULTS
Consult dictated. 82-year-old female with history of intermittent alcohol abuse in the past, anxiety, depression who had a couple of beers and about 6 ounces of whiskey Saturday night after a significant gap. She may have had 1 beer on Sunday. On Monday she was at work when she was witnessed to have a generalized seizure by a coworker. Came to ED, had basic work-up and discharge. Within about 8 hours she ended up having another seizure witnessed by her . Was readmitted. MRI brain and EEG is negative. She has had some involuntary muscle spasms in her hands off and on over the past few weeks. I suspect that this was alcohol withdrawal related seizure with Wellbutrin lowering the seizure threshold. Will defer starting any anticonvulsant medications but would recommend outpatient follow-up with neurology and a prolonged ambulatory EEG. Continue to exercise seizure precautions including no driving for 6 months. Okay to discharge from a neuro standpoint.   Nicho Fuller MD

## 2023-04-26 NOTE — PROGRESS NOTES
BEST PLAN:  RUR-9%  Disposition-Home with spouse  Transportation-by family  F/U with PCP/Specialist    Neuro and GI following    Reason for Admission:  Seizures, ETOH abuse, hx of anxiety                     RUR Score:   9%                  Plan for utilizing home health:   No       PCP: First and Last name:  Sveta Friday, NP     Name of Practice:    Are you a current patient: Yes/No: Yes   Approximate date of last visit: 3 months ago   Can you participate in a virtual visit with your PCP: Yes                    Current Advanced Directive/Advance Care Plan: Full Code      Healthcare Decision Maker:   Click here to complete Yappe including selection of the Healthcare Decision Maker Relationship (ie \"Primary\")             Primary Decision MakerAlcaroline Alvarado - [phone]    Secondary Decision Maker: Claudette Liv - [phone]                  Transition of Care Plan:   Cm met with patient and  to discuss discharge planning. Patient lives with her  and her 10year old son in their private resident with 5 steps to get in the house. She verified her demographic information and did not voice any discharge barriers. CM will continue to follow. Lamberto Shaw MSA, RN, CM  Care Management Interventions  PCP Verified by CM: Yes  Palliative Care Criteria Met (RRAT>21 & CHF Dx)?: No  Mode of Transport at Discharge:  Other (see comment)  Transition of Care Consult (CM Consult): Discharge Planning  MyChart Signup: No  Discharge Durable Medical Equipment: No  Health Maintenance Reviewed: Yes  Physical Therapy Consult: No  Occupational Therapy Consult: No  Speech Therapy Consult: No  Support Systems: Spouse/Significant Other  Confirm Follow Up Transport: Family  Discharge Location  Patient Expects to be Discharged to[de-identified] Home with family assistance

## 2023-04-26 NOTE — PROGRESS NOTES
BEST PLAN:  RUR-9%  Disposition-Home with spouse  Transportation-by family  F/U with PCP/Specialist    Neuro and GI following    Reason for Admission:  Seizures, ETOH abuse, hx of anxiety                     RUR Score:   9%                  Plan for utilizing home health:   No       PCP: First and Last name:  Britt Corley NP     Name of Practice:    Are you a current patient: Yes/No: Yes   Approximate date of last visit: 3 months ago   Can you participate in a virtual visit with your PCP: Yes                    Current Advanced Directive/Advance Care Plan: Full Code      Healthcare Decision Maker:   Click here to complete [address] including selection of the Healthcare Decision Maker Relationship (ie \"Primary\")             Primary Decision MakerWadeisi Myles - [phone]    Secondary Decision Maker: Tiff Barney - [phone]                  Transition of Care Plan:   Cm met with patient and  to discuss discharge planning. Patient lives with her  and her 10year old son in their private resident with 5 steps to get in the house. She verified her demographic information and did not voice any discharge barriers. CM will continue to follow. Eduard Daily MSA, RN, CM  Care Management Interventions  PCP Verified by CM: Yes  Palliative Care Criteria Met (RRAT>21 & CHF Dx)?: No  Mode of Transport at Discharge:  Other (see comment)  Transition of Care Consult (CM Consult): Discharge Planning  MyChart Signup: No  Discharge Durable Medical Equipment: No  Health Maintenance Reviewed: Yes  Physical Therapy Consult: No  Occupational Therapy Consult: No  Speech Therapy Consult: No  Support Systems: Spouse/Significant Other  Confirm Follow Up Transport: Family  Discharge Location  Patient Expects to be Discharged to[de-identified] Home with family assistance

## 2023-04-27 ENCOUNTER — TELEPHONE (OUTPATIENT)
Dept: INTERNAL MEDICINE CLINIC | Age: 43
End: 2023-04-27

## 2023-04-27 ENCOUNTER — TELEPHONE (OUTPATIENT)
Dept: NEUROLOGY | Age: 43
End: 2023-04-27

## 2023-04-27 NOTE — TELEPHONE ENCOUNTER
Patient requesting a letter for return to work. Please call to discuss what needs to be written. She's requesting a call once written so she can pick it up.     She needs the letter by 4/28/23

## 2023-04-27 NOTE — PROGRESS NOTES
Patient called unit for doctors note after discharge. MD notified. Patient personal medications in charge desk drawer.

## 2023-04-27 NOTE — DISCHARGE SUMMARY
Discharge Summary       PATIENT ID: Haylee Chaudhari  MRN: [id number]   YOB: 1980    DATE OF ADMISSION: 4/25/2023 12:51 AM    DATE OF DISCHARGE: 04/26/23     PRIMARY CARE PROVIDER: Chito Guzmán NP     ATTENDING PHYSICIAN: Tang Min MD    DISCHARGING PROVIDER: Tang Min MD    To contact this individual call [phone] and ask the  to page. If unavailable ask to be transferred the Adult Hospitalist Department. CONSULTATIONS: IP CONSULT TO NEUROLOGY  IP CONSULT TO GASTROENTEROLOGY    PROCEDURES/SURGERIES: * No surgery found *    ADMITTING [address] [place] [address] COURSE:   Haylee Chaudhari is a 38 y.o. female with apmhx etoh dependence on naltrexone, and anxiety who presents with seizure, and dysphagia. She states that she has had intermittent dysphagia for solids, and liquids for the past 4 months. On the day of presentation, she was at work speaking with a colleague when her speech became altered followed by loss of consciousness described as a seizure. She was evaluated in the ED, and sent home with recommendations to follow-up with nuerology as outpatient. Later in the evening, she developed seizure-like activity and her  called EMS and she was brought back to the ED. EMS noted patient looked postictal.      She was admitted for observation and further work-up. MRI was negative for anything acute. Patient was evaluated by neurologist.  Symptom triggered [facility] protocol for possible EtOH withdrawal was instituted. Patient never had a recurrence of seizure while inpatient. She did not require except 2 doses of Valium in the preceding 24 hours. No evidence of alcohol withdrawal.  EEG was negative. Neurologist recommended stopping Wellbutrin as it lowers the seizure threshold. Plan was to get inpatient psych eval for alternative medication recommendation and psych was consulted. I discussed with Dr. Jalen Prasad who said he will see her tomorrow.   Patient preferred to go home, she said she has been inconsistent with Wellbutrin in the past anyway that she can wait to follow-up with her outpatient providers. She is advised to follow-up with her PCP/psychiatry as soon as after discharge. Regarding reflux/dysphagia symptoms, GI were consulted and recommended upper GI series. Upper GI series showed some dysmotility and severe reflux. GI recommended PPI and outpatient follow-up for endoscopy. Patient did fine with soft diet. DISCHARGE DIAGNOSES / PLAN:    Seizure-like activity suspected due to EtOH withdrawal.  She had lactic acidosis from seizure activity, normalized. No indication for AEDs at this time. Patient advised seizure precautions including no driving for 6 months. She is urged to quit drinking. She reported having being cutting down recently. Esophageal dysmotility and severe GERD: Discharged on Protonix twice daily for a month then to continue once daily. GI planning for outpatient follow-up and EGD  Depression/anxiety. No present or past history of SI or HI. Patient endorses being inconsistently compliant with medications. Wellbutrin discontinued due to risk of seizure. Continue BuSpar. Patient advised to follow-up with her outpatient providers as soon as after discharge for consideration of alternate medication.       PENDING TEST RESULTS:   At the time of discharge the following test results are still pending: None    FOLLOW UP APPOINTMENTS:    Follow-up Information       Follow up With Specialties Details Why Contact Info Harolyn Gowers, NP Nurse Practitioner   Jessica Hernandez   [address] [place]  [phone]               ADDITIONAL CARE RECOMMENDATIONS:     DIET: Regular soft/liquid diet    ACTIVITY: Activity as tolerated    WOUND CARE: Not applicable    EQUIPMENT needed: Not applicable      DISCHARGE MEDICATIONS:  Discharge Medication List as of 4/26/2023  4:23 PM        START taking these medications Details   thiamine HCL (B-1) 100 mg tablet Take 1 Tablet by mouth daily for 30 days. , Normal, Disp-30 Tablet, R-0      folic acid (FOLVITE) 1 mg tablet Take 1 Tablet by mouth daily for 30 days. , Normal, Disp-30 Tablet, R-0      pantoprazole (Protonix) 40 mg granules for oral suspension Take 40 mg by mouth Before breakfast and dinner for 30 days. , Normal, Disp-60 Each, R-0           CONTINUE these medications which have NOT CHANGED    Details   Doxepin 6 mg tab Take 6 mg by mouth nightly. May take if awakens in middle of night. Indications: difficulty sleeping, Normal, Disp-90 Tablet, R-3      naltrexone (DEPADE) 50 mg tablet TAKE 1 TABLET BY MOUTH EVERY DAY, Normal, Disp-90 Tablet, R-3      busPIRone (BUSPAR) 30 mg tablet TAKE 1 TABLET BY MOUTH TWICE A DAY, Normal, Disp-180 Tablet, R-1           STOP taking these medications       buPROPion XL (WELLBUTRIN XL) 300 mg XL tablet Comments:   Reason for Stopping:                 NOTIFY YOUR PHYSICIAN FOR ANY OF THE FOLLOWING:   Fever over 101 degrees for 24 hours. Chest pain, shortness of breath, fever, chills, nausea, vomiting, diarrhea, change in mentation, falling, weakness, bleeding. Severe pain or pain not relieved by medications. Or, any other signs or symptoms that you may have questions about. DISPOSITION:   x Home With:   OT  PT  HH  RN       Long term SNF/Inpatient Rehab    Independent/assisted living    Hospice    Other:       PATIENT CONDITION AT DISCHARGE:     Functional status    Poor     Deconditioned    x Independent      Cognition    x Lucid     Forgetful     Dementia      Catheters/lines (plus indication)    Peoples     PICC     PEG    x None      Code status     Full code     DNR      PHYSICAL EXAMINATION AT DISCHARGE:    General : alert x 3, awake, no acute distress,   HEENT: Left periorbital bruise.   Neck: supple, no JVD, no meningeal signs  Chest: Clear to auscultation bilaterally   CVS: S1 S2 heard, Capillary refill less than 2 seconds  Abd: soft/ Non tender, non distended, BS physiological,   Ext: no clubbing, no cyanosis, no edema, brisk 2+ DP pulses  Neuro/Psych: pleasant mood and affect, CN 2-12 grossly intact, sensory grossly within normal limit, Strength 5/5 in all extremities, DTR 1+ x 4  Skin: warm     CHRONIC MEDICAL DIAGNOSES:  Problem List as of 4/26/2023 Date Reviewed: 1/13/2023            Codes Class Noted - Resolved    Seizure ([facility] ICD-10-CM: R56.9  ICD-9-CM: 780.39  4/25/2023 - Present        Moderate mixed hyperlipidemia not requiring statin therapy ICD-10-CM: E78.2  ICD-9-CM: 272.2  1/13/2023 - Present        Bipolar depression ([facility] ICD-10-CM: F31.9  ICD-9-CM: 296.50  9/27/2022 - Present        Mild alcohol use disorder, in sustained remission ICD-10-CM: F10.11  ICD-9-CM: 305.03  9/27/2022 - Present        IUD (intrauterine device) in place ICD-10-CM: Z97.5  ICD-9-CM: V45.51  9/27/2022 - Present        Difficulty concentrating ICD-10-CM: R41.840  ICD-9-CM: 799.51  9/27/2022 - Present        Intussusception intestine ([facility] ICD-10-CM: K56.1  ICD-9-CM: 560.0  8/9/2021 - Present        RESOLVED: Crohn's disease of small intestine with complication ([facility] JHT-22-RU: Q05.190  ICD-9-CM: 555.0  8/9/2021 - 1/10/2023           Greater than 31 minutes were spent with the patient on counseling and coordination of care    Signed:    Shaylee Burns MD  4/26/2023  10:46 PM

## 2023-04-27 NOTE — DISCHARGE SUMMARY
Discharge Summary       PATIENT ID: Talia Jiménez  MRN: [id number]   YOB: 1980    DATE OF ADMISSION: 4/25/2023 12:51 AM    DATE OF DISCHARGE: 04/26/23     PRIMARY CARE PROVIDER: Sachi Lange NP     ATTENDING PHYSICIAN: Kemar العلي MD    DISCHARGING PROVIDER: Kemar العلي MD    To contact this individual call [phone] and ask the  to radha. If unavailable ask to be transferred the Adult Hospitalist Department. CONSULTATIONS: IP CONSULT TO NEUROLOGY  IP CONSULT TO GASTROENTEROLOGY    PROCEDURES/SURGERIES: * No surgery found *    ADMITTING [address] [facility] COURSE:   Talia Jiménez is a 38 y.o. female with apmhx etoh dependence on naltrexone, and anxiety who presents with seizure, and dysphagia. She states that she has had intermittent dysphagia for solids, and liquids for the past 4 months. On the day of presentation, she was at work speaking with a colleague when her speech became altered followed by loss of consciousness described as a seizure. She was evaluated in the ED, and sent home with recommendations to follow-up with nuerology as outpatient. Later in the evening, she developed seizure-like activity and her  called EMS and she was brought back to the ED. EMS noted patient looked postictal.      She was admitted for observation and further work-up. MRI was negative for anything acute. Patient was evaluated by neurologist.  Symptom triggered [facility] protocol for possible EtOH withdrawal was instituted. Patient never had a recurrence of seizure while inpatient. She did not require except 2 doses of Valium in the preceding 24 hours. No evidence of alcohol withdrawal.  EEG was negative. Neurologist recommended stopping Wellbutrin as it lowers the seizure threshold. Plan was to get inpatient psych eval for alternative medication recommendation and psych was consulted. I discussed with Dr. Radha Guerrero who said he will see her tomorrow.   Patient preferred to go home, she said she has been inconsistent with Wellbutrin in the past anyway that she can wait to follow-up with her outpatient providers. She is advised to follow-up with her PCP/psychiatry as soon as after discharge. Regarding reflux/dysphagia symptoms, GI were consulted and recommended upper GI series. Upper GI series showed some dysmotility and severe reflux. GI recommended PPI and outpatient follow-up for endoscopy. Patient did fine with soft diet. DISCHARGE DIAGNOSES / PLAN:    Seizure-like activity suspected due to EtOH withdrawal.  She had lactic acidosis from seizure activity, normalized. No indication for AEDs at this time. Patient advised seizure precautions including no driving for 6 months. She is urged to quit drinking. She reported having being cutting down recently. Esophageal dysmotility and severe GERD: Discharged on Protonix twice daily for a month then to continue once daily. GI planning for outpatient follow-up and EGD  Depression/anxiety. No present or past history of SI or HI. Patient endorses being inconsistently compliant with medications. Wellbutrin discontinued due to risk of seizure. Continue BuSpar. Patient advised to follow-up with her outpatient providers as soon as after discharge for consideration of alternate medication.       PENDING TEST RESULTS:   At the time of discharge the following test results are still pending: None    FOLLOW UP APPOINTMENTS:    Follow-up Information       Follow up With Specialties Details Why Contact Marisel Roberson NP Nurse Practitioner   Jessica Hernandez  [address] [place] [address] [place]  [phone]               ADDITIONAL CARE RECOMMENDATIONS:     DIET: Regular soft/liquid diet    ACTIVITY: Activity as tolerated    WOUND CARE: Not applicable    EQUIPMENT needed: Not applicable      DISCHARGE MEDICATIONS:  Discharge Medication List as of 4/26/2023  4:23 PM        START taking these medications Details   thiamine HCL (B-1) 100 mg tablet Take 1 Tablet by mouth daily for 30 days. , Normal, Disp-30 Tablet, R-0      folic acid (FOLVITE) 1 mg tablet Take 1 Tablet by mouth daily for 30 days. , Normal, Disp-30 Tablet, R-0      pantoprazole (Protonix) 40 mg granules for oral suspension Take 40 mg by mouth Before breakfast and dinner for 30 days. , Normal, Disp-60 Each, R-0           CONTINUE these medications which have NOT CHANGED    Details   Doxepin 6 mg tab Take 6 mg by mouth nightly. May take if awakens in middle of night. Indications: difficulty sleeping, Normal, Disp-90 Tablet, R-3      naltrexone (DEPADE) 50 mg tablet TAKE 1 TABLET BY MOUTH EVERY DAY, Normal, Disp-90 Tablet, R-3      busPIRone (BUSPAR) 30 mg tablet TAKE 1 TABLET BY MOUTH TWICE A DAY, Normal, Disp-180 Tablet, R-1           STOP taking these medications       buPROPion XL (WELLBUTRIN XL) 300 mg XL tablet Comments:   Reason for Stopping:                 NOTIFY YOUR PHYSICIAN FOR ANY OF THE FOLLOWING:   Fever over 101 degrees for 24 hours. Chest pain, shortness of breath, fever, chills, nausea, vomiting, diarrhea, change in mentation, falling, weakness, bleeding. Severe pain or pain not relieved by medications. Or, any other signs or symptoms that you may have questions about. DISPOSITION:   x Home With:   OT  PT  HH  RN       Long term SNF/Inpatient Rehab    Independent/assisted living    Hospice    Other:       PATIENT CONDITION AT DISCHARGE:     Functional status    Poor     Deconditioned    x Independent      Cognition    x Lucid     Forgetful     Dementia      Catheters/lines (plus indication)    Peoples     PICC     PEG    x None      Code status     Full code     DNR      PHYSICAL EXAMINATION AT DISCHARGE:    General : alert x 3, awake, no acute distress,   HEENT: Left periorbital bruise.   Neck: supple, no JVD, no meningeal signs  Chest: Clear to auscultation bilaterally   CVS: S1 S2 heard, Capillary refill less than 2 seconds  Abd: soft/ Non tender, non distended, BS physiological,   Ext: no clubbing, no cyanosis, no edema, brisk 2+ DP pulses  Neuro/Psych: pleasant mood and affect, CN 2-12 grossly intact, sensory grossly within normal limit, Strength 5/5 in all extremities, DTR 1+ x 4  Skin: warm     CHRONIC MEDICAL DIAGNOSES:  Problem List as of 4/26/2023 Date Reviewed: 1/13/2023            Codes Class Noted - Resolved    Seizure ([facility] ICD-10-CM: R56.9  ICD-9-CM: 780.39  4/25/2023 - Present        Moderate mixed hyperlipidemia not requiring statin therapy ICD-10-CM: E78.2  ICD-9-CM: 272.2  1/13/2023 - Present        Bipolar depression ([facility] ICD-10-CM: F31.9  ICD-9-CM: 296.50  9/27/2022 - Present        Mild alcohol use disorder, in sustained remission ICD-10-CM: F10.11  ICD-9-CM: 305.03  9/27/2022 - Present        IUD (intrauterine device) in place ICD-10-CM: Z97.5  ICD-9-CM: V45.51  9/27/2022 - Present        Difficulty concentrating ICD-10-CM: R41.840  ICD-9-CM: 799.51  9/27/2022 - Present        Intussusception intestine ([facility] ICD-10-CM: K56.1  ICD-9-CM: 560.0  8/9/2021 - Present        RESOLVED: Crohn's disease of small intestine with complication ([facility] BLW-02-OU: U30.807  ICD-9-CM: 555.0  8/9/2021 - 1/10/2023           Greater than 31 minutes were spent with the patient on counseling and coordination of care    Signed:    Florencia Regalado MD  4/26/2023  10:46 PM

## 2023-04-28 NOTE — TELEPHONE ENCOUNTER
Called patient. Patient is requesting a letter stating she can go back to work and specifically can handle hand held tools at work. She was seen in the hospital yesterday. She stated that she asked Dr. Kobi Roblero for a letter but was told that it should come from the neurologist. She states \"I understand I cannot drive but I at least want to work. \"

## 2023-04-28 NOTE — TELEPHONE ENCOUNTER
Called patient. Patient was informed that the doctor has written the letter.  Informed her that the hours of  is 8AM-4PM.

## 2023-05-08 RX ORDER — FOLIC ACID 1 MG/1
1 TABLET ORAL DAILY
Qty: 30 TABLET | Refills: 0 | COMMUNITY
Start: 2023-04-26 | End: 2023-05-26

## 2023-05-08 RX ORDER — PANTOPRAZOLE SODIUM 40 MG/1
40 FOR SUSPENSION ORAL
Qty: 60 PACKET | Refills: 0 | COMMUNITY
Start: 2023-04-26 | End: 2023-05-26

## 2023-05-08 RX ORDER — LANOLIN ALCOHOL/MO/W.PET/CERES
100 CREAM (GRAM) TOPICAL DAILY
Qty: 30 TABLET | Refills: 0 | COMMUNITY
Start: 2023-04-26 | End: 2023-05-26

## 2023-07-25 ENCOUNTER — OFFICE VISIT (OUTPATIENT)
Age: 43
End: 2023-07-25
Payer: MEDICAID

## 2023-07-25 VITALS
BODY MASS INDEX: 21.75 KG/M2 | DIASTOLIC BLOOD PRESSURE: 78 MMHG | HEIGHT: 67 IN | RESPIRATION RATE: 16 BRPM | SYSTOLIC BLOOD PRESSURE: 132 MMHG | OXYGEN SATURATION: 100 % | TEMPERATURE: 97.8 F | HEART RATE: 90 BPM

## 2023-07-25 DIAGNOSIS — R25.2 SPASTICITY: ICD-10-CM

## 2023-07-25 DIAGNOSIS — R56.9 SEIZURE (HCC): Primary | ICD-10-CM

## 2023-07-25 PROCEDURE — 99215 OFFICE O/P EST HI 40 MIN: CPT | Performed by: NURSE PRACTITIONER

## 2023-07-25 RX ORDER — ATOMOXETINE 80 MG/1
CAPSULE ORAL
COMMUNITY
Start: 2023-07-06

## 2023-07-26 ENCOUNTER — TELEPHONE (OUTPATIENT)
Age: 43
End: 2023-07-26

## 2023-07-26 NOTE — TELEPHONE ENCOUNTER
Patient is requesting EEG & MRI. She said she was told to ask for [facility], she sounded confused. 'm not sure who to send this to. ..you or [facility], as she requested.

## 2023-07-26 NOTE — PROGRESS NOTES
Curt Bryan is a 41 y.o. female who presents with the following  Chief Complaint   Patient presents with    New Patient     Patient presents today for C/O on and off hand issues, when she may have trouble using her hands, she states numbness and tingling maybe a better way to describe it.  On and off sx since Feb.         HPI      Patient comes in was incorrectly scheduled so she is actually follow-up  She did have what sounds to be some type of a withdrawal seizure from alcohol and Wellbutrin  She did see Dr. Altagracia Dean in the hospital at [facility] in April  She has not had any full-blown seizures since then but has had some significant symptoms neurologically    She did have an abnormal brain MRI which we will repeat  She has had some issues with overall trouble using her hands  She states they get numbness and tingling and then she loses control of them  Last time this happened was about 1 month ago   No rhyme or reason   We did discuss this could be potentially myoclonic type seizure activity  She did have an EEG in the hospital which looks to be read normal  She has not had an EMG  She is working at coming off of alcohol altogether and is on naltrexone    She does have some memory loss and which she is working on  This is getting better though  She does feel like all of that was from Wellbutrin and alcohol    She is following with a counselor  She is on Strattera for attention deficit disorder    Allergies   Allergen Reactions    Prochlorperazine Nausea Only       Current Outpatient Medications   Medication Sig Dispense Refill    atomoxetine (STRATTERA) 80 MG capsule TAKE 1 CAPSULE BY MOUTH EVERY DAY IN THE MORNING      busPIRone (BUSPAR) 30 MG tablet Take 30 mg by mouth 2 times daily      naltrexone (DEPADE) 50 MG tablet Take 1 tablet by mouth daily      pantoprazole sodium (PROTONIX) 40 MG PACK packet Take 1 packet by mouth 2 times daily (before meals) 60 packet 0     No current facility-administered

## 2023-07-26 NOTE — TELEPHONE ENCOUNTER
Patient is requesting a call back to see if the EMG appointment can be rescheduled for a Tuesday or a Thursday. Please contact.

## 2023-08-10 ENCOUNTER — HOSPITAL ENCOUNTER (OUTPATIENT)
Facility: HOSPITAL | Age: 43
Discharge: HOME | End: 2023-08-10
Payer: MEDICAID

## 2023-08-10 ENCOUNTER — PROCEDURE VISIT (OUTPATIENT)
Age: 43
End: 2023-08-10
Payer: MEDICAID

## 2023-08-10 DIAGNOSIS — R56.9 SEIZURE (HCC): ICD-10-CM

## 2023-08-10 DIAGNOSIS — R20.0 NUMBNESS AND TINGLING IN BOTH HANDS: Primary | ICD-10-CM

## 2023-08-10 DIAGNOSIS — R20.2 NUMBNESS AND TINGLING IN BOTH HANDS: Primary | ICD-10-CM

## 2023-08-10 DIAGNOSIS — R25.2 SPASTICITY: ICD-10-CM

## 2023-08-10 PROCEDURE — A9579 GAD-BASE MR CONTRAST NOS,1ML: HCPCS

## 2023-08-10 PROCEDURE — 95911 NRV CNDJ TEST 9-10 STUDIES: CPT | Performed by: PSYCHIATRY & NEUROLOGY

## 2023-08-10 PROCEDURE — 70553 MRI BRAIN STEM W/O & W/DYE: CPT

## 2023-08-10 PROCEDURE — 6360000004 HC RX CONTRAST MEDICATION

## 2023-08-10 PROCEDURE — 95886 MUSC TEST DONE W/N TEST COMP: CPT | Performed by: PSYCHIATRY & NEUROLOGY

## 2023-08-10 RX ADMIN — GADOTERIDOL 10 ML: 279.3 INJECTION, SOLUTION INTRAVENOUS at 20:45

## 2023-09-18 ENCOUNTER — TELEPHONE (OUTPATIENT)
Age: 43
End: 2023-09-18

## 2023-09-18 NOTE — TELEPHONE ENCOUNTER
Patient has an scheduled appointment tomorrow 9/19/23 that she cannot make it to and would like to know if an virtual or telehealth appointment can be done instead if possible    Please contact

## 2023-09-19 ENCOUNTER — TELEPHONE (OUTPATIENT)
Age: 43
End: 2023-09-19

## 2023-09-19 NOTE — TELEPHONE ENCOUNTER
Call placed to patient. LVM on identified line advising the NP Andriy does not do virtual or telehealth visits.

## 2023-09-28 ENCOUNTER — NURSE TRIAGE (OUTPATIENT)
Dept: OTHER | Facility: CLINIC | Age: 43
End: 2023-09-28

## 2023-09-28 ENCOUNTER — TELEPHONE (OUTPATIENT)
Age: 43
End: 2023-09-28

## 2023-09-28 NOTE — TELEPHONE ENCOUNTER
[facility] [facility]    Received call from [address] at [facility] with [employer] Complaint. Subjective: Caller states \"high BP\"     Current Symptoms: Does not have HTN that she is aware of. Does not have cuff to check BP. Was abnormally high recently. Could just feel like it was high. Was seen at a holistic care provider today. BP was taken and it was 160/90. After care went down to 130/90. Tuesday was also elevated. No stressful situations. No illness  Denies chest pain or dizziness  Will feel anxious when it goes high. Onset: today, but has been intermittent. Associated Symptoms: NA    Pain Severity:     Temperature: denies     What has been tried:     LMP: NA Pregnant: No    Recommended disposition: See in Office Within 2 [address] advice provided, patient verbalizes understanding; denies any other questions or concerns; instructed to call back for any new or worsening symptoms. Patient/Caller agrees with recommended disposition; writer provided warm transfer to [facility] at [facility] for appointment scheduling    Attention Provider: Thank you for allowing me to participate in the care of your patient. The patient was connected to triage in response to information provided to the ECC/PSC. Please do not respond through this encounter as the response is not directed to a shared pool.       Reason for Disposition   Systolic BP >= 137 OR Diastolic >= 80, and is not taking BP medications    Protocols used: Blood Pressure - High-ADULT-[place]

## 2023-09-29 ENCOUNTER — OFFICE VISIT (OUTPATIENT)
Facility: CLINIC | Age: 43
End: 2023-09-29
Payer: MEDICAID

## 2023-09-29 VITALS
OXYGEN SATURATION: 97 % | SYSTOLIC BLOOD PRESSURE: 140 MMHG | TEMPERATURE: 97 F | RESPIRATION RATE: 19 BRPM | HEIGHT: 67 IN | DIASTOLIC BLOOD PRESSURE: 99 MMHG | BODY MASS INDEX: 21.03 KG/M2 | HEART RATE: 95 BPM | WEIGHT: 134 LBS

## 2023-09-29 DIAGNOSIS — R03.0 ELEVATED BP WITHOUT DIAGNOSIS OF HYPERTENSION: Primary | ICD-10-CM

## 2023-09-29 DIAGNOSIS — F10.11 MILD ALCOHOL USE DISORDER, IN SUSTAINED REMISSION: ICD-10-CM

## 2023-09-29 DIAGNOSIS — F41.8 ANXIETY ABOUT HEALTH: ICD-10-CM

## 2023-09-29 DIAGNOSIS — J32.3 CHRONIC SPHENOIDAL SINUSITIS: ICD-10-CM

## 2023-09-29 DIAGNOSIS — F90.2 ATTENTION DEFICIT HYPERACTIVITY DISORDER (ADHD), COMBINED TYPE: ICD-10-CM

## 2023-09-29 PROCEDURE — 99214 OFFICE O/P EST MOD 30 MIN: CPT | Performed by: NURSE PRACTITIONER

## 2023-09-29 RX ORDER — AMOXICILLIN AND CLAVULANATE POTASSIUM 875; 125 MG/1; MG/1
1 TABLET, FILM COATED ORAL 2 TIMES DAILY
Qty: 20 TABLET | Refills: 0 | Status: SHIPPED | OUTPATIENT
Start: 2023-09-29 | End: 2023-10-09

## 2023-09-29 RX ORDER — NAPROXEN 500 MG/1
500 TABLET ORAL 2 TIMES DAILY WITH MEALS
Qty: 20 TABLET | Refills: 0 | Status: SHIPPED | OUTPATIENT
Start: 2023-09-29 | End: 2023-10-09

## 2023-09-29 RX ORDER — GUANFACINE 1 MG/1
0.5 TABLET ORAL NIGHTLY
Qty: 30 TABLET | Refills: 2 | Status: SHIPPED | OUTPATIENT
Start: 2023-09-29

## 2023-09-29 RX ORDER — FLUTICASONE PROPIONATE 50 MCG
2 SPRAY, SUSPENSION (ML) NASAL DAILY
Qty: 16 G | Refills: 0 | Status: SHIPPED | OUTPATIENT
Start: 2023-09-29

## 2023-09-29 SDOH — ECONOMIC STABILITY: FOOD INSECURITY: WITHIN THE PAST 12 MONTHS, YOU WORRIED THAT YOUR FOOD WOULD RUN OUT BEFORE YOU GOT MONEY TO BUY MORE.: NEVER TRUE

## 2023-09-29 SDOH — ECONOMIC STABILITY: FOOD INSECURITY: WITHIN THE PAST 12 MONTHS, THE FOOD YOU BOUGHT JUST DIDN'T LAST AND YOU DIDN'T HAVE MONEY TO GET MORE.: NEVER TRUE

## 2023-09-29 SDOH — ECONOMIC STABILITY: INCOME INSECURITY: HOW HARD IS IT FOR YOU TO PAY FOR THE VERY BASICS LIKE FOOD, HOUSING, MEDICAL CARE, AND HEATING?: NOT HARD AT ALL

## 2023-09-29 SDOH — ECONOMIC STABILITY: HOUSING INSECURITY
IN THE LAST 12 MONTHS, WAS THERE A TIME WHEN YOU DID NOT HAVE A STEADY PLACE TO SLEEP OR SLEPT IN A SHELTER (INCLUDING NOW)?: NO

## 2023-09-29 NOTE — PATIENT INSTRUCTIONS
Call if your blood pressure readings remain OVER 140/90 WHILE taking the medicine. Allow at least one week for the medicine to take a good effect.

## 2023-09-29 NOTE — PROGRESS NOTES
Feel that pt may benefit from BP cuff, for monitoring. BP has been elevated for a few days and is really a big concern for patient. Pt is here for   Chief Complaint   Patient presents with    Hypertension     Elevated  something/120 something on Tuesday morning. Pt states that she wasn't anxious or stressed at that time. Seizures     Has been following up with with Neurology. 1. Have you been to the ER, urgent care clinic since your last visit? Hospitalized since your last visit? No    2. Have you seen or consulted any other health care providers outside of the [address] [place] [address] Avenue since your last visit? Include any pap smears or colon screening.  No
or syrinx. Pituitary  and infundibulum grossly unremarkable. Cerebellopontine angles are unremarkable. No skull base mass is identified. Cavernous sinuses are symmetric. The mastoid  air cells and are well pneumatized and clear. Impression  Stable examination with minimal nonspecific foci of increased signal intensity  in the cerebral white matter. Migraine headaches and minimal microvascular  changes are in the differential however this limited number of increased T2  signal intensity foci may be of no clinical significance. There is moderate to severe sphenoid sinus disease on the right    . No intracranial mass, hemorrhage or evidence of acute infarction.         Review of Systems  Constitutional: negative for fevers, chills, anorexia and weight loss  Respiratory:  negative for cough, hemoptysis, dyspnea, and wheezing  CV:   negative for chest pain, palpitations, and lower extremity edema  GI:   negative for nausea, vomiting, diarrhea, abdominal pain, and melena  Endo:               negative for polyuria,polydipsia,polyphagia, and heat intolerance  Genitourinary: negative for frequency, urgency, dysuria, retention, and hematuria  Integument:  negative for rash, ulcerations, and pruritus  Hematologic:  negative for easy bruising and bleeding  Musculoskel: negative for arthralgias, muscle weakness,and joint pain/swelling  Neurological:  negative for headaches, dizziness, vertigo,and memory/gait problems  Behavl/Psych: negative for feelings of mood changes and suicidal ideation    Past Medical History:   Diagnosis Date    Anxiety     Depression     Migraine     Seizures ([address])        Past Surgical History:   Procedure Laterality Date    COLONOSCOPY N/A 8/26/2021    COLONOSCOPY   :- performed by Teofilo Martinez MD at [facility] ENDOSCOPY       Current Outpatient Medications   Medication Sig    atomoxetine (STRATTERA) 80 MG capsule TAKE 1 CAPSULE BY MOUTH EVERY DAY IN THE MORNING    busPIRone (BUSPAR) 30 MG tablet

## 2023-10-02 ENCOUNTER — TELEPHONE (OUTPATIENT)
Age: 43
End: 2023-10-02

## 2023-10-02 NOTE — TELEPHONE ENCOUNTER
Call placed to patient. LVM on identified line stating that NP Fernando Gonzalez has not seen her and that it looks like our NP Agapito at [facility] has be treating her and ordered the EEG. Advised that I had routed her message regarding the EEG results to [facility] nurse.

## 2023-11-09 ENCOUNTER — OFFICE VISIT (OUTPATIENT)
Facility: CLINIC | Age: 43
End: 2023-11-09
Payer: MEDICAID

## 2023-11-09 VITALS
TEMPERATURE: 97.9 F | HEIGHT: 67 IN | BODY MASS INDEX: 21.35 KG/M2 | SYSTOLIC BLOOD PRESSURE: 140 MMHG | DIASTOLIC BLOOD PRESSURE: 90 MMHG | RESPIRATION RATE: 18 BRPM | OXYGEN SATURATION: 99 % | HEART RATE: 81 BPM | WEIGHT: 136 LBS

## 2023-11-09 DIAGNOSIS — J32.3 CHRONIC SPHENOIDAL SINUSITIS: ICD-10-CM

## 2023-11-09 DIAGNOSIS — R19.8 DIFFICULTY SWALLOWING PILLS: ICD-10-CM

## 2023-11-09 DIAGNOSIS — Z97.5 IUD (INTRAUTERINE DEVICE) IN PLACE: ICD-10-CM

## 2023-11-09 DIAGNOSIS — N93.9 ABNORMAL UTERINE BLEEDING (AUB): ICD-10-CM

## 2023-11-09 DIAGNOSIS — I10 PRIMARY HYPERTENSION: Primary | ICD-10-CM

## 2023-11-09 DIAGNOSIS — F41.8 ANXIETY ABOUT HEALTH: ICD-10-CM

## 2023-11-09 PROBLEM — R13.10 DIFFICULTY SWALLOWING PILLS: Status: ACTIVE | Noted: 2023-11-09

## 2023-11-09 PROCEDURE — 3077F SYST BP >= 140 MM HG: CPT | Performed by: NURSE PRACTITIONER

## 2023-11-09 PROCEDURE — 99214 OFFICE O/P EST MOD 30 MIN: CPT | Performed by: NURSE PRACTITIONER

## 2023-11-09 PROCEDURE — 3080F DIAST BP >= 90 MM HG: CPT | Performed by: NURSE PRACTITIONER

## 2023-11-09 RX ORDER — AMLODIPINE BESYLATE 5 MG/1
5 TABLET ORAL DAILY
Qty: 90 TABLET | Refills: 1 | Status: SHIPPED | OUTPATIENT
Start: 2023-11-09

## 2023-11-09 RX ORDER — CLONIDINE HYDROCHLORIDE 0.1 MG/1
0.1 TABLET ORAL EVERY 8 HOURS PRN
Qty: 30 TABLET | Refills: 2 | Status: SHIPPED | OUTPATIENT
Start: 2023-11-09

## 2023-11-09 SDOH — ECONOMIC STABILITY: FOOD INSECURITY: WITHIN THE PAST 12 MONTHS, YOU WORRIED THAT YOUR FOOD WOULD RUN OUT BEFORE YOU GOT MONEY TO BUY MORE.: NEVER TRUE

## 2023-11-09 SDOH — ECONOMIC STABILITY: INCOME INSECURITY: HOW HARD IS IT FOR YOU TO PAY FOR THE VERY BASICS LIKE FOOD, HOUSING, MEDICAL CARE, AND HEATING?: NOT HARD AT ALL

## 2023-11-09 SDOH — ECONOMIC STABILITY: FOOD INSECURITY: WITHIN THE PAST 12 MONTHS, THE FOOD YOU BOUGHT JUST DIDN'T LAST AND YOU DIDN'T HAVE MONEY TO GET MORE.: NEVER TRUE

## 2023-11-09 ASSESSMENT — ANXIETY QUESTIONNAIRES
7. FEELING AFRAID AS IF SOMETHING AWFUL MIGHT HAPPEN: 0
GAD7 TOTAL SCORE: 2
IF YOU CHECKED OFF ANY PROBLEMS ON THIS QUESTIONNAIRE, HOW DIFFICULT HAVE THESE PROBLEMS MADE IT FOR YOU TO DO YOUR WORK, TAKE CARE OF THINGS AT HOME, OR GET ALONG WITH OTHER PEOPLE: SOMEWHAT DIFFICULT
1. FEELING NERVOUS, ANXIOUS, OR ON EDGE: 1
3. WORRYING TOO MUCH ABOUT DIFFERENT THINGS: 0
6. BECOMING EASILY ANNOYED OR IRRITABLE: 0
4. TROUBLE RELAXING: 0
2. NOT BEING ABLE TO STOP OR CONTROL WORRYING: 1
5. BEING SO RESTLESS THAT IT IS HARD TO SIT STILL: 0

## 2023-11-09 ASSESSMENT — PATIENT HEALTH QUESTIONNAIRE - PHQ9
SUM OF ALL RESPONSES TO PHQ QUESTIONS 1-9: 2
7. TROUBLE CONCENTRATING ON THINGS, SUCH AS READING THE NEWSPAPER OR WATCHING TELEVISION: 0
10. IF YOU CHECKED OFF ANY PROBLEMS, HOW DIFFICULT HAVE THESE PROBLEMS MADE IT FOR YOU TO DO YOUR WORK, TAKE CARE OF THINGS AT HOME, OR GET ALONG WITH OTHER PEOPLE: 1
9. THOUGHTS THAT YOU WOULD BE BETTER OFF DEAD, OR OF HURTING YOURSELF: 0
2. FEELING DOWN, DEPRESSED OR HOPELESS: 1
SUM OF ALL RESPONSES TO PHQ9 QUESTIONS 1 & 2: 2
4. FEELING TIRED OR HAVING LITTLE ENERGY: 0
SUM OF ALL RESPONSES TO PHQ QUESTIONS 1-9: 2
SUM OF ALL RESPONSES TO PHQ QUESTIONS 1-9: 2
5. POOR APPETITE OR OVEREATING: 0
3. TROUBLE FALLING OR STAYING ASLEEP: 0
SUM OF ALL RESPONSES TO PHQ QUESTIONS 1-9: 2
6. FEELING BAD ABOUT YOURSELF - OR THAT YOU ARE A FAILURE OR HAVE LET YOURSELF OR YOUR FAMILY DOWN: 0
1. LITTLE INTEREST OR PLEASURE IN DOING THINGS: 1
8. MOVING OR SPEAKING SO SLOWLY THAT OTHER PEOPLE COULD HAVE NOTICED. OR THE OPPOSITE, BEING SO FIGETY OR RESTLESS THAT YOU HAVE BEEN MOVING AROUND A LOT MORE THAN USUAL: 0

## 2023-11-09 NOTE — PATIENT INSTRUCTIONS
May also try Delsym, Phenylephrine, or Chlor-Trimetron for symptoms while using nasal spray and Allegra (fexofenadine). Use your nasal spray NIGHTLY before bed, even if you don't have symptoms. It will take at least 10 DAYS to provide relief. Don't stop using until the allergens or weather has settled or you usually have no symptoms. For example, most people have spring or summer allergies, therefore starting about 2-4 week before the season change is advised. Other people have winter or fall allergies and they need to start at a different time. Living here in [place], you may benefit from year around use, as this is not harming anything and kurt likely help with several of your symptoms to include, post-nasal drip, dry cough, congestion, sinus pressure, runny nose, and watery/itchy eyes. Try using the nasal spray by holding the tip at the entry to your nostril, do not insert it deeply. Spray once in each nostril (no SNIFFING),THEN IMMEDIATELY pinch your nose with your index finger and thumb leaning forward while breathing out of your mouth for 30-60 seconds. You may repeat for a second spray each nostril if needed.

## 2024-01-11 ENCOUNTER — OFFICE VISIT (OUTPATIENT)
Facility: CLINIC | Age: 44
End: 2024-01-11
Payer: MEDICAID

## 2024-01-11 VITALS
HEART RATE: 74 BPM | WEIGHT: 134 LBS | OXYGEN SATURATION: 100 % | DIASTOLIC BLOOD PRESSURE: 95 MMHG | HEIGHT: 67 IN | SYSTOLIC BLOOD PRESSURE: 119 MMHG | BODY MASS INDEX: 21.03 KG/M2 | RESPIRATION RATE: 18 BRPM | TEMPERATURE: 97 F

## 2024-01-11 DIAGNOSIS — Z13.220 SCREENING FOR LIPID DISORDERS: ICD-10-CM

## 2024-01-11 DIAGNOSIS — N93.9 ABNORMAL UTERINE BLEEDING (AUB): ICD-10-CM

## 2024-01-11 DIAGNOSIS — I10 PRIMARY HYPERTENSION: Primary | ICD-10-CM

## 2024-01-11 DIAGNOSIS — F31.9 BIPOLAR DEPRESSION (HCC): ICD-10-CM

## 2024-01-11 DIAGNOSIS — F41.8 ANXIETY ABOUT HEALTH: ICD-10-CM

## 2024-01-11 DIAGNOSIS — F10.11 MILD ALCOHOL USE DISORDER, IN SUSTAINED REMISSION: ICD-10-CM

## 2024-01-11 DIAGNOSIS — I10 PRIMARY HYPERTENSION: ICD-10-CM

## 2024-01-11 PROCEDURE — 3080F DIAST BP >= 90 MM HG: CPT | Performed by: NURSE PRACTITIONER

## 2024-01-11 PROCEDURE — 3074F SYST BP LT 130 MM HG: CPT | Performed by: NURSE PRACTITIONER

## 2024-01-11 PROCEDURE — 99214 OFFICE O/P EST MOD 30 MIN: CPT | Performed by: NURSE PRACTITIONER

## 2024-01-11 RX ORDER — ESCITALOPRAM OXALATE 5 MG/1
TABLET ORAL
COMMUNITY
Start: 2024-01-09

## 2024-01-11 RX ORDER — FLUCONAZOLE 100 MG/1
100 TABLET ORAL DAILY
COMMUNITY

## 2024-01-11 SDOH — ECONOMIC STABILITY: INCOME INSECURITY: HOW HARD IS IT FOR YOU TO PAY FOR THE VERY BASICS LIKE FOOD, HOUSING, MEDICAL CARE, AND HEATING?: NOT HARD AT ALL

## 2024-01-11 SDOH — ECONOMIC STABILITY: FOOD INSECURITY: WITHIN THE PAST 12 MONTHS, THE FOOD YOU BOUGHT JUST DIDN'T LAST AND YOU DIDN'T HAVE MONEY TO GET MORE.: NEVER TRUE

## 2024-01-11 SDOH — ECONOMIC STABILITY: FOOD INSECURITY: WITHIN THE PAST 12 MONTHS, YOU WORRIED THAT YOUR FOOD WOULD RUN OUT BEFORE YOU GOT MONEY TO BUY MORE.: NEVER TRUE

## 2024-01-11 ASSESSMENT — PATIENT HEALTH QUESTIONNAIRE - PHQ9
6. FEELING BAD ABOUT YOURSELF - OR THAT YOU ARE A FAILURE OR HAVE LET YOURSELF OR YOUR FAMILY DOWN: 0
SUM OF ALL RESPONSES TO PHQ QUESTIONS 1-9: 2
SUM OF ALL RESPONSES TO PHQ QUESTIONS 1-9: 2
9. THOUGHTS THAT YOU WOULD BE BETTER OFF DEAD, OR OF HURTING YOURSELF: 0
8. MOVING OR SPEAKING SO SLOWLY THAT OTHER PEOPLE COULD HAVE NOTICED. OR THE OPPOSITE, BEING SO FIGETY OR RESTLESS THAT YOU HAVE BEEN MOVING AROUND A LOT MORE THAN USUAL: 0
2. FEELING DOWN, DEPRESSED OR HOPELESS: 1
4. FEELING TIRED OR HAVING LITTLE ENERGY: 0
SUM OF ALL RESPONSES TO PHQ QUESTIONS 1-9: 2
SUM OF ALL RESPONSES TO PHQ QUESTIONS 1-9: 2
7. TROUBLE CONCENTRATING ON THINGS, SUCH AS READING THE NEWSPAPER OR WATCHING TELEVISION: 0
SUM OF ALL RESPONSES TO PHQ9 QUESTIONS 1 & 2: 2
1. LITTLE INTEREST OR PLEASURE IN DOING THINGS: 1
5. POOR APPETITE OR OVEREATING: 0
3. TROUBLE FALLING OR STAYING ASLEEP: 0
10. IF YOU CHECKED OFF ANY PROBLEMS, HOW DIFFICULT HAVE THESE PROBLEMS MADE IT FOR YOU TO DO YOUR WORK, TAKE CARE OF THINGS AT HOME, OR GET ALONG WITH OTHER PEOPLE: 1

## 2024-01-11 ASSESSMENT — ANXIETY QUESTIONNAIRES
5. BEING SO RESTLESS THAT IT IS HARD TO SIT STILL: 0
1. FEELING NERVOUS, ANXIOUS, OR ON EDGE: 1
GAD7 TOTAL SCORE: 2
2. NOT BEING ABLE TO STOP OR CONTROL WORRYING: 1
4. TROUBLE RELAXING: 0
3. WORRYING TOO MUCH ABOUT DIFFERENT THINGS: 0
IF YOU CHECKED OFF ANY PROBLEMS ON THIS QUESTIONNAIRE, HOW DIFFICULT HAVE THESE PROBLEMS MADE IT FOR YOU TO DO YOUR WORK, TAKE CARE OF THINGS AT HOME, OR GET ALONG WITH OTHER PEOPLE: SOMEWHAT DIFFICULT
7. FEELING AFRAID AS IF SOMETHING AWFUL MIGHT HAPPEN: 0
6. BECOMING EASILY ANNOYED OR IRRITABLE: 0

## 2024-01-11 NOTE — PROGRESS NOTES
Maggi Bucio is a 43 y.o. female  HIPAA verified by two patient identifiers.   Health Maintenance Due   Topic Date Due    Hepatitis B vaccine (1 of 3 - 3-dose series) Never done    Varicella vaccine (1 of 2 - 2-dose childhood series) Never done    Cervical cancer screen  Never done    Flu vaccine (1) 08/01/2023    COVID-19 Vaccine (3 - 2023-24 season) 09/01/2023     Chief Complaint   Patient presents with    Follow-up     HTN med change, GYN ref, labs.     BP (!) 119/95   Pulse 74   Temp 97 °F (36.1 °C) (Temporal)   Resp 18   Ht 1.702 m (5' 7\")   Wt 60.8 kg (134 lb)   LMP 12/07/2023   SpO2 100%   BMI 20.99 kg/m²     Pain Scale: 0 - No pain/10  Pain Location:   1. Have you been to the ER, urgent care clinic since your last visit?  Hospitalized since your last visit?No    2. Have you seen or consulted any other health care providers outside of the [facility] since your last visit?  Include any pap smears or colon screening. No

## 2024-01-11 NOTE — PROGRESS NOTES
Maggi Bucio 1980 is a 43 y.o. female, Established patient, here for evaluation of the following chief complaint(s):  Follow-up (HTN med change, GYN ref, labs.)      ASSESSMENT/PLAN:  Below is the assessment and plan developed based on review of pertinent history, physical exam, labs, studies, and medications.    1. Primary hypertension  Not at goal, no med changes since pt had relapse of alcoholism and sparing BP checks at home. Will continue amlodipine 5 mg use with PRN clonidine only for now. INI will consider propranolol or increasing amlodipine to 10 mg.   - CBC with Auto Differential; Future  - Comprehensive Metabolic Panel; Future    2. Mild alcohol use disorder, in sustained remission  Improved with 3 day sobriety. Agreed with plan by psych to restart naltrexone.    3. Anxiety about health  stable    4. Abnormal uterine bleeding (AUB)  Stable, managed by GYN. Encouraged to keep appt as scheduled.     5. Screening for lipid disorders    - Lipid Panel; Future    6. Bipolar depression (HCC)  Stable, encouraged to discuss with psych, as she has not yet.   - CBC with Auto Differential; Future  - Comprehensive Metabolic Panel; Future  - TSH; Future  - Vitamin D 25 Hydroxy; Future            Follow-up and Dispositions    Return for OV 4 wk- HTN, ETOH.         Pt asked to complete follow by next visit: Call if any problems    SUBJECTIVE/OBJECTIVE:  HPI    Pt presents to f/u HTN med change, GYN ref, labs. Taking lexapro for mood since Tuesday. Denies side effects from medication. Feels good over, but slipped back into alcoholism since last visit, stopped taking naltrexone since doing well and felt she no longer needed. Sober for past 3 days. Has checked BP a few times since last visit, recalls last one 140/107, but often only checks when she has symptoms versus just daily monitoring. Feels BP's are overall better than before. Will see GYN on Tuesday. Also needs labs done for psych.   BP Readings from Last 3

## 2024-01-12 LAB
25(OH)D3+25(OH)D2 SERPL-MCNC: 30.5 NG/ML (ref 30–100)
ALBUMIN SERPL-MCNC: 4 G/DL (ref 3.9–4.9)
ALBUMIN/GLOB SERPL: 1.4 {RATIO} (ref 1.2–2.2)
ALP SERPL-CCNC: 64 IU/L (ref 44–121)
ALT SERPL-CCNC: 16 IU/L (ref 0–32)
AST SERPL-CCNC: 33 IU/L (ref 0–40)
BASOPHILS # BLD AUTO: 0.1 X10E3/UL (ref 0–0.2)
BASOPHILS NFR BLD AUTO: 1 %
BILIRUB SERPL-MCNC: 0.4 MG/DL (ref 0–1.2)
BUN SERPL-MCNC: 6 MG/DL (ref 6–24)
BUN/CREAT SERPL: 11 (ref 9–23)
CALCIUM SERPL-MCNC: 9.6 MG/DL (ref 8.7–10.2)
CHLORIDE SERPL-SCNC: 101 MMOL/L (ref 96–106)
CHOLEST SERPL-MCNC: 178 MG/DL (ref 100–199)
CO2 SERPL-SCNC: 23 MMOL/L (ref 20–29)
CREAT SERPL-MCNC: 0.53 MG/DL (ref 0.57–1)
EGFRCR SERPLBLD CKD-EPI 2021: 119 ML/MIN/1.73
EOSINOPHIL # BLD AUTO: 0 X10E3/UL (ref 0–0.4)
EOSINOPHIL NFR BLD AUTO: 0 %
ERYTHROCYTE [DISTWIDTH] IN BLOOD BY AUTOMATED COUNT: 18 % (ref 11.7–15.4)
GLOBULIN SER CALC-MCNC: 2.8 G/DL (ref 1.5–4.5)
GLUCOSE SERPL-MCNC: 87 MG/DL (ref 70–99)
HCT VFR BLD AUTO: 40.5 % (ref 34–46.6)
HDLC SERPL-MCNC: 81 MG/DL
HGB BLD-MCNC: 13.3 G/DL (ref 11.1–15.9)
IMM GRANULOCYTES # BLD AUTO: 0 X10E3/UL (ref 0–0.1)
IMM GRANULOCYTES NFR BLD AUTO: 0 %
IMP & REVIEW OF LAB RESULTS: NORMAL
LDLC SERPL CALC-MCNC: 86 MG/DL (ref 0–99)
LYMPHOCYTES # BLD AUTO: 1.3 X10E3/UL (ref 0.7–3.1)
LYMPHOCYTES NFR BLD AUTO: 19 %
MCH RBC QN AUTO: 28.8 PG (ref 26.6–33)
MCHC RBC AUTO-ENTMCNC: 32.8 G/DL (ref 31.5–35.7)
MCV RBC AUTO: 88 FL (ref 79–97)
MONOCYTES # BLD AUTO: 0.8 X10E3/UL (ref 0.1–0.9)
MONOCYTES NFR BLD AUTO: 12 %
NEUTROPHILS # BLD AUTO: 4.5 X10E3/UL (ref 1.4–7)
NEUTROPHILS NFR BLD AUTO: 68 %
PLATELET # BLD AUTO: 378 X10E3/UL (ref 150–450)
POTASSIUM SERPL-SCNC: 4.6 MMOL/L (ref 3.5–5.2)
PROT SERPL-MCNC: 6.8 G/DL (ref 6–8.5)
RBC # BLD AUTO: 4.62 X10E6/UL (ref 3.77–5.28)
SODIUM SERPL-SCNC: 139 MMOL/L (ref 134–144)
TRIGL SERPL-MCNC: 59 MG/DL (ref 0–149)
TSH SERPL DL<=0.005 MIU/L-ACNC: 0.62 UIU/ML (ref 0.45–4.5)
VLDLC SERPL CALC-MCNC: 11 MG/DL (ref 5–40)
WBC # BLD AUTO: 6.8 X10E3/UL (ref 3.4–10.8)

## 2024-01-16 ENCOUNTER — OFFICE VISIT (OUTPATIENT)
Age: 44
End: 2024-01-16
Payer: MEDICAID

## 2024-01-16 VITALS
HEIGHT: 67 IN | SYSTOLIC BLOOD PRESSURE: 126 MMHG | DIASTOLIC BLOOD PRESSURE: 80 MMHG | WEIGHT: 138 LBS | BODY MASS INDEX: 21.66 KG/M2

## 2024-01-16 DIAGNOSIS — Z01.419 ENCOUNTER FOR GYNECOLOGICAL EXAMINATION WITHOUT ABNORMAL FINDING: Primary | ICD-10-CM

## 2024-01-16 DIAGNOSIS — N93.9 ABNORMAL UTERINE BLEEDING (AUB): ICD-10-CM

## 2024-01-16 DIAGNOSIS — Z97.5 IUD (INTRAUTERINE DEVICE) IN PLACE: ICD-10-CM

## 2024-01-16 DIAGNOSIS — Z11.3 SCREENING FOR STDS (SEXUALLY TRANSMITTED DISEASES): ICD-10-CM

## 2024-01-16 DIAGNOSIS — Z12.31 SCREENING MAMMOGRAM FOR BREAST CANCER: ICD-10-CM

## 2024-01-16 PROBLEM — K21.9 GASTROESOPHAGEAL REFLUX DISEASE: Status: ACTIVE | Noted: 2021-10-26

## 2024-01-16 PROBLEM — F41.9 ANXIETY: Status: ACTIVE | Noted: 2021-10-26

## 2024-01-16 PROBLEM — F32.A DEPRESSION: Status: ACTIVE | Noted: 2021-10-26

## 2024-01-16 PROBLEM — K76.0 FATTY LIVER: Status: ACTIVE | Noted: 2022-01-18

## 2024-01-16 PROCEDURE — 99386 PREV VISIT NEW AGE 40-64: CPT | Performed by: OBSTETRICS & GYNECOLOGY

## 2024-01-16 NOTE — PROGRESS NOTES
[facility]       Current Outpatient Medications   Medication Sig Dispense Refill    escitalopram (LEXAPRO) 5 MG tablet       amLODIPine (NORVASC) 5 MG tablet Take 1 tablet by mouth daily 90 tablet 1    cloNIDine (CATAPRES) 0.1 MG tablet Take 1 tablet by mouth every 8 hours as needed for High Blood Pressure Readings over 160/100 30 tablet 2    atomoxetine (STRATTERA) 80 MG capsule TAKE 1 CAPSULE BY MOUTH EVERY DAY IN THE MORNING      busPIRone (BUSPAR) 30 MG tablet Take 30 mg by mouth 2 times daily      naltrexone (DEPADE) 50 MG tablet Take 1 tablet by mouth daily      naproxen (NAPROSYN) 500 MG tablet Take 1 tablet by mouth 2 times daily (with meals) for 10 days 20 tablet 0     No current facility-administered medications for this visit.     Allergies: Prochlorperazine     Tobacco History:  reports that she has never smoked. She has never been exposed to tobacco smoke. She has never used smokeless tobacco.  Alcohol Abuse:  reports that she does not currently use alcohol.  Drug Abuse:  reports that she does not currently use drugs.    Family Medical/Cancer History:   Family History   Problem Relation Age of Onset    Hypertension Mother     Cancer Mother         unsure of type    Stroke Father     Hypertension Father     Hypertension Brother         Review of Systems   Review of Systems - History obtained from the patient    Constitutional: negative for weight loss, fever, night sweats  HEENT: negative for hearing loss, earache,   CV: negative for chest pain, palpitations, edema  Resp: negative for cough, shortness of breath, wheezing  GI: negative for change in bowel habits, abdominal pain, black or bloody stools  : negative for frequency, dysuria, hematuria, vaginal discharge  MSK: negative for back pain, joint pain, muscle pain  Breast: negative for breast lumps, nipple discharge, galactorrhea  Skin :negative for itching, rash, hives  Neuro: negative for dizziness, headache, confusion, weakness  Psych: negative for

## 2024-01-19 LAB
C TRACH RRNA CVX QL NAA+PROBE: NEGATIVE
CYTOLOGIST CVX/VAG CYTO: NORMAL
CYTOLOGY CVX/VAG DOC CYTO: NORMAL
CYTOLOGY CVX/VAG DOC THIN PREP: NORMAL
DX ICD CODE: NORMAL
HPV GENOTYPE REFLEX: NORMAL
HPV I/H RISK 4 DNA CVX QL PROBE+SIG AMP: NEGATIVE
Lab: NORMAL
N GONORRHOEA RRNA CVX QL NAA+PROBE: NEGATIVE
OTHER STN SPEC: NORMAL
STAT OF ADQ CVX/VAG CYTO-IMP: NORMAL

## 2024-01-31 ENCOUNTER — OFFICE VISIT (OUTPATIENT)
Age: 44
End: 2024-01-31
Payer: MEDICAID

## 2024-01-31 VITALS — WEIGHT: 139 LBS | DIASTOLIC BLOOD PRESSURE: 82 MMHG | SYSTOLIC BLOOD PRESSURE: 124 MMHG | BODY MASS INDEX: 21.77 KG/M2

## 2024-01-31 DIAGNOSIS — Z30.431 IUD CHECK UP: Primary | ICD-10-CM

## 2024-01-31 PROCEDURE — 99213 OFFICE O/P EST LOW 20 MIN: CPT | Performed by: OBSTETRICS & GYNECOLOGY

## 2024-01-31 NOTE — PROGRESS NOTES
Ultrasound/ Lab Follow up Visit    Chief Complaint:   Chief Complaint   Patient presents with    Follow-up     Ultrasound follow up       HPI:    Maggi Bucio is a 43 y.o.  female with LMP of Patient's last menstrual period was 2024 (approximate).  who presents for ultrasound follow up. Ultrasound was done for IUD placement.       Most recent ultrasound showed:     IUD placed properly in the uterine cavity.     Past Medical History:    Past Medical History:   Diagnosis Date    ADHD (attention deficit hyperactivity disorder)     Alcohol use disorder     Anxiety     Bipolar depression (HCC)     Depression     Hyperlipidemia     Hypertension     Migraine     Seizures (HCC)         Past Surgical History:   Procedure Laterality Date    COLONOSCOPY N/A 2021    COLONOSCOPY   :- performed by Silvestre Holbrook MD at [facility] ENDOSCOPY    ELBOW SURGERY      HAND TENDON SURGERY      INDUCED       INTRAUTERINE DEVICE INSERTION  2017    [facility]     Social History     Occupational History    Not on file   Tobacco Use    Smoking status: Never     Passive exposure: Never    Smokeless tobacco: Never   Vaping Use    Vaping Use: Never used   Substance and Sexual Activity    Alcohol use: Not Currently    Drug use: Not Currently    Sexual activity: Yes     Birth control/protection: I.U.D.     Family History   Problem Relation Age of Onset    Hypertension Mother     Cancer Mother         unsure of type    Stroke Father     Hypertension Father     Hypertension Brother        Allergies   Allergen Reactions    Prochlorperazine Nausea Only     Prior to Admission medications    Medication Sig Start Date End Date Taking? Authorizing Provider   escitalopram (LEXAPRO) 5 MG tablet 2 tablets 24  Yes Provider, MD Mercedes   amLODIPine (NORVASC) 5 MG tablet Take 1 tablet by mouth daily 23  Yes Jenna Garcia APRN - NP   cloNIDine (CATAPRES) 0.1 MG tablet Take 1 tablet by mouth every 8 hours as needed

## 2024-02-13 ENCOUNTER — OFFICE VISIT (OUTPATIENT)
Facility: CLINIC | Age: 44
End: 2024-02-13
Payer: MEDICAID

## 2024-02-13 VITALS
BODY MASS INDEX: 21.82 KG/M2 | DIASTOLIC BLOOD PRESSURE: 95 MMHG | OXYGEN SATURATION: 99 % | SYSTOLIC BLOOD PRESSURE: 127 MMHG | TEMPERATURE: 97 F | HEIGHT: 67 IN | WEIGHT: 139 LBS | RESPIRATION RATE: 18 BRPM | HEART RATE: 108 BPM

## 2024-02-13 DIAGNOSIS — F10.11 MILD ALCOHOL USE DISORDER, IN SUSTAINED REMISSION: ICD-10-CM

## 2024-02-13 DIAGNOSIS — I10 PRIMARY HYPERTENSION: Primary | ICD-10-CM

## 2024-02-13 DIAGNOSIS — Z87.09 HISTORY OF INFLUENZA: ICD-10-CM

## 2024-02-13 DIAGNOSIS — F41.8 ANXIETY ABOUT HEALTH: ICD-10-CM

## 2024-02-13 DIAGNOSIS — R07.9 RIGHT-SIDED CHEST PAIN: ICD-10-CM

## 2024-02-13 PROCEDURE — 3080F DIAST BP >= 90 MM HG: CPT | Performed by: NURSE PRACTITIONER

## 2024-02-13 PROCEDURE — 3074F SYST BP LT 130 MM HG: CPT | Performed by: NURSE PRACTITIONER

## 2024-02-13 PROCEDURE — 93000 ELECTROCARDIOGRAM COMPLETE: CPT | Performed by: NURSE PRACTITIONER

## 2024-02-13 PROCEDURE — 99214 OFFICE O/P EST MOD 30 MIN: CPT | Performed by: NURSE PRACTITIONER

## 2024-02-13 RX ORDER — AMLODIPINE BESYLATE 5 MG/1
5 TABLET ORAL DAILY
Qty: 90 TABLET | Refills: 1 | Status: CANCELLED | OUTPATIENT
Start: 2024-02-13

## 2024-02-13 RX ORDER — CLONIDINE HYDROCHLORIDE 0.1 MG/1
0.1 TABLET ORAL NIGHTLY
Qty: 90 TABLET | Refills: 1 | Status: SHIPPED | OUTPATIENT
Start: 2024-02-13

## 2024-02-13 NOTE — PROGRESS NOTES
Pt is here for   Chief Complaint   Patient presents with    Follow-up     HTN, ETOH    right sided pain     X 1 week    Pt states that she also had the flu last week and was dehydrated              1. Have you been to the ER, urgent care clinic since your last visit?  Hospitalized since your last visit?No    2. Have you seen or consulted any other health care providers outside of the [facility] since your last visit?  Include any pap smears or colon screening. No    Pt BP this morning before visits 148/111  
negative for fevers, chills, anorexia and weight loss  Respiratory:  negative for cough, hemoptysis, dyspnea, and wheezing  CV:   negative for chest pain, palpitations, and lower extremity edema  GI:   negative for nausea, vomiting, diarrhea, abdominal pain, and melena  Endo:               negative for polyuria,polydipsia,polyphagia, and heat intolerance  Genitourinary: negative for frequency, urgency, dysuria, retention, and hematuria  Integument:  negative for rash, ulcerations, and pruritus  Hematologic:  negative for easy bruising and bleeding  Musculoskel: negative for arthralgias, muscle weakness,and joint pain/swelling  Neurological:  negative for headaches, dizziness, vertigo,and memory/gait problems  Behavl/Psych: negative for feelings of anxiety, depression, mood changes, and suicidal ideation    Past Medical History:   Diagnosis Date    ADHD (attention deficit hyperactivity disorder)     Alcohol use disorder     Anxiety     Bipolar depression (HCC)     Depression     Hyperlipidemia     Hypertension     Migraine     Seizures (HCC)        Past Surgical History:   Procedure Laterality Date    COLONOSCOPY N/A 2021    COLONOSCOPY   :- performed by Silvestre Holbrook MD at [facility] ENDOSCOPY    ELBOW SURGERY      HAND TENDON SURGERY      INDUCED       INTRAUTERINE DEVICE INSERTION  2017    [facility]       Current Outpatient Medications   Medication Sig    cloNIDine (CATAPRES) 0.1 MG tablet Take 1 tablet by mouth nightly    escitalopram (LEXAPRO) 5 MG tablet 2 tablets    amLODIPine (NORVASC) 5 MG tablet Take 1 tablet by mouth daily    atomoxetine (STRATTERA) 80 MG capsule TAKE 1 CAPSULE BY MOUTH EVERY DAY IN THE MORNING    busPIRone (BUSPAR) 30 MG tablet Take 30 mg by mouth 2 times daily    naltrexone (DEPADE) 50 MG tablet Take 1 tablet by mouth daily     No current facility-administered medications for this visit.       Vitals:    24 1040   BP: (!) 127/95   Site: Right Upper Arm   Position:

## 2024-03-01 ENCOUNTER — TELEPHONE (OUTPATIENT)
Facility: CLINIC | Age: 44
End: 2024-03-01

## 2024-03-01 NOTE — TELEPHONE ENCOUNTER
----- Message from Deena Austin sent at 2/27/2024 10:57 AM EST -----  Subject: Referral Request    Reason for referral request? Patient would like are referral for a   Dermatologist  Provider patient wants to be referred to(if known):     Provider Phone Number(if known):    Additional Information for Provider? prefers someone within the network   ---------------------------------------------------------------------------  --------------  CALL BACK INFO    [phone]; OK to leave message on voicemail  ---------------------------------------------------------------------------  --------------

## 2024-03-05 ENCOUNTER — HOSPITAL ENCOUNTER (OUTPATIENT)
Facility: HOSPITAL | Age: 44
Discharge: HOME | End: 2024-03-08
Payer: MEDICAID

## 2024-03-05 DIAGNOSIS — Z87.09 HISTORY OF INFLUENZA: ICD-10-CM

## 2024-03-05 PROCEDURE — 71046 X-RAY EXAM CHEST 2 VIEWS: CPT

## 2024-03-28 ENCOUNTER — TELEPHONE (OUTPATIENT)
Facility: CLINIC | Age: 44
End: 2024-03-28

## 2024-03-28 DIAGNOSIS — L98.9 ABNORMALITY, SKIN: Primary | ICD-10-CM

## 2024-03-28 NOTE — TELEPHONE ENCOUNTER
----- Message from Gwendolyn Baker sent at 3/19/2024 11:25 AM EDT -----  Subject: Referral Request    Reason for referral request? Pt calling about dermatologist referral for   concern for \"skin stuff; two spots on leg\"  Provider patient wants to be referred to(if known):     Provider Phone Number(if known):    Additional Information for Provider?   ---------------------------------------------------------------------------  --------------  CALL BACK INFO    [phone]; OK to leave message on voicemail  ---------------------------------------------------------------------------  --------------

## 2024-09-24 ENCOUNTER — OFFICE VISIT (OUTPATIENT)
Facility: CLINIC | Age: 44
End: 2024-09-24
Payer: MEDICAID

## 2024-09-24 VITALS
DIASTOLIC BLOOD PRESSURE: 90 MMHG | HEIGHT: 67 IN | OXYGEN SATURATION: 100 % | WEIGHT: 130 LBS | RESPIRATION RATE: 18 BRPM | HEART RATE: 90 BPM | BODY MASS INDEX: 20.4 KG/M2 | TEMPERATURE: 98.5 F | SYSTOLIC BLOOD PRESSURE: 125 MMHG

## 2024-09-24 DIAGNOSIS — Z97.5 IUD (INTRAUTERINE DEVICE) IN PLACE: ICD-10-CM

## 2024-09-24 DIAGNOSIS — D22.9 ATYPICAL NEVI: ICD-10-CM

## 2024-09-24 DIAGNOSIS — F10.11 MILD ALCOHOL USE DISORDER, IN SUSTAINED REMISSION: Primary | ICD-10-CM

## 2024-09-24 DIAGNOSIS — N92.6 IRREGULAR MENSES: ICD-10-CM

## 2024-09-24 DIAGNOSIS — F31.9 BIPOLAR AFFECTIVE DISORDER, REMISSION STATUS UNSPECIFIED (HCC): ICD-10-CM

## 2024-09-24 DIAGNOSIS — I10 PRIMARY HYPERTENSION: ICD-10-CM

## 2024-09-24 PROCEDURE — 3074F SYST BP LT 130 MM HG: CPT | Performed by: NURSE PRACTITIONER

## 2024-09-24 PROCEDURE — 3080F DIAST BP >= 90 MM HG: CPT | Performed by: NURSE PRACTITIONER

## 2024-09-24 PROCEDURE — 99214 OFFICE O/P EST MOD 30 MIN: CPT | Performed by: NURSE PRACTITIONER

## 2024-09-24 RX ORDER — AMLODIPINE BESYLATE 5 MG/1
5 TABLET ORAL DAILY
Qty: 90 TABLET | Refills: 1 | Status: SHIPPED | OUTPATIENT
Start: 2024-09-24

## 2024-09-24 RX ORDER — LAMOTRIGINE 100 MG/1
100 TABLET ORAL DAILY
COMMUNITY
Start: 2024-07-03

## 2024-09-24 RX ORDER — CLONIDINE HYDROCHLORIDE 0.1 MG/1
0.1 TABLET ORAL NIGHTLY
Qty: 90 TABLET | Refills: 1 | Status: SHIPPED | OUTPATIENT
Start: 2024-09-24

## 2024-09-24 RX ORDER — ACAMPROSATE CALCIUM 333 MG/1
TABLET, DELAYED RELEASE ORAL
COMMUNITY
Start: 2024-08-23

## 2024-09-24 RX ORDER — NALTREXONE HYDROCHLORIDE 50 MG/1
TABLET, FILM COATED ORAL
Qty: 120 TABLET | Refills: 0 | Status: SHIPPED | OUTPATIENT
Start: 2024-09-24

## 2024-09-24 RX ORDER — POTASSIUM CHLORIDE 750 MG/1
20 TABLET, EXTENDED RELEASE ORAL 2 TIMES DAILY
COMMUNITY
Start: 2024-07-18

## (undated) DEVICE — FCPS RAD JAW 4LC 240CM W/NDL -- BX/20 RADIAL JAW 4